# Patient Record
Sex: FEMALE | Race: BLACK OR AFRICAN AMERICAN | NOT HISPANIC OR LATINO
[De-identification: names, ages, dates, MRNs, and addresses within clinical notes are randomized per-mention and may not be internally consistent; named-entity substitution may affect disease eponyms.]

---

## 2017-06-14 ENCOUNTER — OTHER (OUTPATIENT)
Age: 67
End: 2017-06-14

## 2017-06-14 DIAGNOSIS — M25.561 PAIN IN RIGHT KNEE: ICD-10-CM

## 2017-06-14 DIAGNOSIS — M25.562 PAIN IN RIGHT KNEE: ICD-10-CM

## 2017-06-28 ENCOUNTER — OTHER (OUTPATIENT)
Age: 67
End: 2017-06-28

## 2017-07-06 ENCOUNTER — APPOINTMENT (OUTPATIENT)
Dept: ORTHOPEDIC SURGERY | Facility: CLINIC | Age: 67
End: 2017-07-06

## 2017-07-06 VITALS
WEIGHT: 209 LBS | SYSTOLIC BLOOD PRESSURE: 156 MMHG | BODY MASS INDEX: 38.46 KG/M2 | HEIGHT: 62 IN | DIASTOLIC BLOOD PRESSURE: 78 MMHG | HEART RATE: 72 BPM

## 2017-07-06 DIAGNOSIS — Z86.39 PERSONAL HISTORY OF OTHER ENDOCRINE, NUTRITIONAL AND METABOLIC DISEASE: ICD-10-CM

## 2017-07-06 DIAGNOSIS — Z78.9 OTHER SPECIFIED HEALTH STATUS: ICD-10-CM

## 2017-08-04 ENCOUNTER — OUTPATIENT (OUTPATIENT)
Dept: OUTPATIENT SERVICES | Facility: HOSPITAL | Age: 67
LOS: 1 days | End: 2017-08-04
Payer: MEDICARE

## 2017-08-04 VITALS
TEMPERATURE: 98 F | DIASTOLIC BLOOD PRESSURE: 88 MMHG | SYSTOLIC BLOOD PRESSURE: 150 MMHG | HEIGHT: 62 IN | HEART RATE: 64 BPM | RESPIRATION RATE: 16 BRPM | WEIGHT: 215.17 LBS

## 2017-08-04 DIAGNOSIS — Z98.890 OTHER SPECIFIED POSTPROCEDURAL STATES: Chronic | ICD-10-CM

## 2017-08-04 DIAGNOSIS — I10 ESSENTIAL (PRIMARY) HYPERTENSION: ICD-10-CM

## 2017-08-04 DIAGNOSIS — Z01.818 ENCOUNTER FOR OTHER PREPROCEDURAL EXAMINATION: ICD-10-CM

## 2017-08-04 DIAGNOSIS — M17.11 UNILATERAL PRIMARY OSTEOARTHRITIS, RIGHT KNEE: ICD-10-CM

## 2017-08-04 DIAGNOSIS — E11.9 TYPE 2 DIABETES MELLITUS WITHOUT COMPLICATIONS: ICD-10-CM

## 2017-08-04 LAB
ANION GAP SERPL CALC-SCNC: 14 MMOL/L — SIGNIFICANT CHANGE UP (ref 5–17)
APTT BLD: 29 SEC — SIGNIFICANT CHANGE UP (ref 27.5–37.4)
BASOPHILS # BLD AUTO: 0 K/UL — SIGNIFICANT CHANGE UP (ref 0–0.2)
BASOPHILS NFR BLD AUTO: 0.2 % — SIGNIFICANT CHANGE UP (ref 0–2)
BLD GP AB SCN SERPL QL: SIGNIFICANT CHANGE UP
BUN SERPL-MCNC: 14 MG/DL — SIGNIFICANT CHANGE UP (ref 8–20)
CALCIUM SERPL-MCNC: 9.6 MG/DL — SIGNIFICANT CHANGE UP (ref 8.6–10.2)
CHLORIDE SERPL-SCNC: 100 MMOL/L — SIGNIFICANT CHANGE UP (ref 98–107)
CO2 SERPL-SCNC: 26 MMOL/L — SIGNIFICANT CHANGE UP (ref 22–29)
CREAT SERPL-MCNC: 0.86 MG/DL — SIGNIFICANT CHANGE UP (ref 0.5–1.3)
EOSINOPHIL # BLD AUTO: 0.1 K/UL — SIGNIFICANT CHANGE UP (ref 0–0.5)
EOSINOPHIL NFR BLD AUTO: 2.4 % — SIGNIFICANT CHANGE UP (ref 0–6)
GLUCOSE SERPL-MCNC: 100 MG/DL — SIGNIFICANT CHANGE UP (ref 70–115)
HBA1C BLD-MCNC: 6.4 % — HIGH (ref 4–5.6)
HCT VFR BLD CALC: 32.2 % — LOW (ref 37–47)
HGB BLD-MCNC: 10.4 G/DL — LOW (ref 12–16)
INR BLD: 1.03 RATIO — SIGNIFICANT CHANGE UP (ref 0.88–1.16)
LYMPHOCYTES # BLD AUTO: 2.1 K/UL — SIGNIFICANT CHANGE UP (ref 1–4.8)
LYMPHOCYTES # BLD AUTO: 38.1 % — SIGNIFICANT CHANGE UP (ref 20–55)
MCHC RBC-ENTMCNC: 27.4 PG — SIGNIFICANT CHANGE UP (ref 27–31)
MCHC RBC-ENTMCNC: 32.3 G/DL — SIGNIFICANT CHANGE UP (ref 32–36)
MCV RBC AUTO: 85 FL — SIGNIFICANT CHANGE UP (ref 81–99)
MONOCYTES # BLD AUTO: 0.3 K/UL — SIGNIFICANT CHANGE UP (ref 0–0.8)
MONOCYTES NFR BLD AUTO: 4.8 % — SIGNIFICANT CHANGE UP (ref 3–10)
MRSA PCR RESULT.: SIGNIFICANT CHANGE UP
NEUTROPHILS # BLD AUTO: 3 K/UL — SIGNIFICANT CHANGE UP (ref 1.8–8)
NEUTROPHILS NFR BLD AUTO: 54.3 % — SIGNIFICANT CHANGE UP (ref 37–73)
PLATELET # BLD AUTO: 311 K/UL — SIGNIFICANT CHANGE UP (ref 150–400)
POTASSIUM SERPL-MCNC: 4.4 MMOL/L — SIGNIFICANT CHANGE UP (ref 3.5–5.3)
POTASSIUM SERPL-SCNC: 4.4 MMOL/L — SIGNIFICANT CHANGE UP (ref 3.5–5.3)
PROTHROM AB SERPL-ACNC: 11.3 SEC — SIGNIFICANT CHANGE UP (ref 9.8–12.7)
RBC # BLD: 3.79 M/UL — LOW (ref 4.4–5.2)
RBC # FLD: 14.7 % — SIGNIFICANT CHANGE UP (ref 11–15.6)
S AUREUS DNA NOSE QL NAA+PROBE: SIGNIFICANT CHANGE UP
SODIUM SERPL-SCNC: 140 MMOL/L — SIGNIFICANT CHANGE UP (ref 135–145)
TYPE + AB SCN PNL BLD: SIGNIFICANT CHANGE UP
WBC # BLD: 5.4 K/UL — SIGNIFICANT CHANGE UP (ref 4.8–10.8)
WBC # FLD AUTO: 5.4 K/UL — SIGNIFICANT CHANGE UP (ref 4.8–10.8)

## 2017-08-04 PROCEDURE — 93010 ELECTROCARDIOGRAM REPORT: CPT

## 2017-08-04 PROCEDURE — 86850 RBC ANTIBODY SCREEN: CPT

## 2017-08-04 PROCEDURE — 86900 BLOOD TYPING SEROLOGIC ABO: CPT

## 2017-08-04 PROCEDURE — G0463: CPT

## 2017-08-04 PROCEDURE — 85730 THROMBOPLASTIN TIME PARTIAL: CPT

## 2017-08-04 PROCEDURE — 83036 HEMOGLOBIN GLYCOSYLATED A1C: CPT

## 2017-08-04 PROCEDURE — 87641 MR-STAPH DNA AMP PROBE: CPT

## 2017-08-04 PROCEDURE — 85027 COMPLETE CBC AUTOMATED: CPT

## 2017-08-04 PROCEDURE — 86901 BLOOD TYPING SEROLOGIC RH(D): CPT

## 2017-08-04 PROCEDURE — 85610 PROTHROMBIN TIME: CPT

## 2017-08-04 PROCEDURE — 87640 STAPH A DNA AMP PROBE: CPT

## 2017-08-04 PROCEDURE — 93005 ELECTROCARDIOGRAM TRACING: CPT

## 2017-08-04 PROCEDURE — 80048 BASIC METABOLIC PNL TOTAL CA: CPT

## 2017-08-04 PROCEDURE — 36415 COLL VENOUS BLD VENIPUNCTURE: CPT

## 2017-08-04 RX ORDER — SODIUM CHLORIDE 9 MG/ML
3 INJECTION INTRAMUSCULAR; INTRAVENOUS; SUBCUTANEOUS EVERY 8 HOURS
Qty: 0 | Refills: 0 | Status: DISCONTINUED | OUTPATIENT
Start: 2017-08-25 | End: 2017-08-25

## 2017-08-04 NOTE — H&P PST ADULT - PMH
Diabetes mellitus    GERD (gastroesophageal reflux disease)    Hyperlipidemia    Hypertension    Osteoarthritis    Sleep apnea

## 2017-08-04 NOTE — H&P PST ADULT - ASSESSMENT
66F PMH HTN, Hyperlipidemia, DM, Sleep Apnea, GERD and Osteoarthritis for Right Knee Total Joint Replacement.

## 2017-08-04 NOTE — H&P PST ADULT - FAMILY HISTORY
Mother  Still living? No  Family history of renal failure, Age at diagnosis: 61-70  Family history of hypertension, Age at diagnosis: Age Unknown     Sibling  Still living? Yes, Estimated age: Age Unknown  Family history of renal failure, Age at diagnosis: Age Unknown     Sibling  Still living? Yes, Estimated age: Age Unknown  Family history of renal failure, Age at diagnosis: Age Unknown     Father  Still living? No  Family history of coronary artery disease, Age at diagnosis: Age Unknown  Family history of diabetes mellitus, Age at diagnosis: Age Unknown

## 2017-08-07 ENCOUNTER — OTHER (OUTPATIENT)
Age: 67
End: 2017-08-07

## 2017-08-10 ENCOUNTER — APPOINTMENT (OUTPATIENT)
Dept: ORTHOPEDIC SURGERY | Facility: CLINIC | Age: 67
End: 2017-08-10
Payer: MEDICARE

## 2017-08-10 VITALS
DIASTOLIC BLOOD PRESSURE: 76 MMHG | HEIGHT: 62 IN | SYSTOLIC BLOOD PRESSURE: 148 MMHG | HEART RATE: 77 BPM | BODY MASS INDEX: 38.64 KG/M2 | WEIGHT: 210 LBS

## 2017-08-10 DIAGNOSIS — M17.11 UNILATERAL PRIMARY OSTEOARTHRITIS, RIGHT KNEE: ICD-10-CM

## 2017-08-10 PROCEDURE — 99213 OFFICE O/P EST LOW 20 MIN: CPT | Mod: 25

## 2017-08-10 PROCEDURE — 20610 DRAIN/INJ JOINT/BURSA W/O US: CPT | Mod: LT

## 2017-08-15 RX ORDER — VANCOMYCIN HCL 1 G
1500 VIAL (EA) INTRAVENOUS ONCE
Qty: 0 | Refills: 0 | Status: COMPLETED | OUTPATIENT
Start: 2017-08-25 | End: 2017-08-25

## 2017-08-15 RX ORDER — CEFAZOLIN SODIUM 1 G
2000 VIAL (EA) INJECTION ONCE
Qty: 0 | Refills: 0 | Status: DISCONTINUED | OUTPATIENT
Start: 2017-08-25 | End: 2017-08-25

## 2017-08-15 RX ORDER — TRANEXAMIC ACID 100 MG/ML
1000 INJECTION, SOLUTION INTRAVENOUS ONCE
Qty: 0 | Refills: 0 | Status: DISCONTINUED | OUTPATIENT
Start: 2017-08-25 | End: 2017-08-25

## 2017-08-15 RX ORDER — OXYCODONE HYDROCHLORIDE 5 MG/1
20 TABLET ORAL ONCE
Qty: 0 | Refills: 0 | Status: DISCONTINUED | OUTPATIENT
Start: 2017-08-25 | End: 2017-08-25

## 2017-08-15 RX ORDER — SCOPALAMINE 1 MG/3D
1.5 PATCH, EXTENDED RELEASE TRANSDERMAL ONCE
Qty: 0 | Refills: 0 | Status: COMPLETED | OUTPATIENT
Start: 2017-08-25 | End: 2017-08-25

## 2017-08-15 RX ORDER — GABAPENTIN 400 MG/1
300 CAPSULE ORAL ONCE
Qty: 0 | Refills: 0 | Status: COMPLETED | OUTPATIENT
Start: 2017-08-25 | End: 2017-08-25

## 2017-08-15 RX ORDER — ACETAMINOPHEN 500 MG
1000 TABLET ORAL ONCE
Qty: 0 | Refills: 0 | Status: DISCONTINUED | OUTPATIENT
Start: 2017-08-25 | End: 2017-08-25

## 2017-08-15 RX ORDER — CELECOXIB 200 MG/1
400 CAPSULE ORAL ONCE
Qty: 0 | Refills: 0 | Status: COMPLETED | OUTPATIENT
Start: 2017-08-25 | End: 2017-08-25

## 2017-08-16 DIAGNOSIS — E78.5 HYPERLIPIDEMIA, UNSPECIFIED: ICD-10-CM

## 2017-08-16 DIAGNOSIS — I10 ESSENTIAL (PRIMARY) HYPERTENSION: ICD-10-CM

## 2017-08-16 DIAGNOSIS — K21.9 GASTRO-ESOPHAGEAL REFLUX DISEASE WITHOUT ESOPHAGITIS: ICD-10-CM

## 2017-08-16 NOTE — CONSULT NOTE ADULT - PROBLEM SELECTOR RECOMMENDATION 3
Hgb A1c 6.4  Continue Metformin.  Will need sliding scale coverage post-op. Type 2 , well controlled . Hgb A1c 6.4  Continue Metformin. Hold Metformin on day of surgery .  Will need sliding scale coverage post-op.

## 2017-08-16 NOTE — CONSULT NOTE ADULT - ASSESSMENT
65y/o female with PMH HTN, HLD, DM2, GERD, osteoarthritis presented for medical clearance for Right Knee Total Joint Replacement. Patient reports she has had right knee pain for 10years with worsening pain and difficulty ambulating, now using cane.  Patient has had cortisone injections in the past with limited relief.

## 2017-08-16 NOTE — CONSULT NOTE ADULT - SUBJECTIVE AND OBJECTIVE BOX
PMD: Dr. Aviles    CC: Right knee pain [id number]    HPI:  67y/o female with PMH HTN, HLD, DM2, GERD, osteoarthritis presented for medical clearance for Right Knee Total Joint Replacement. Patient reports she has had right knee pain for 10years with worsening pain and difficulty ambulating, now using cane.  Patient has had cortisone injections in the past with limited relief.        PAST MEDICAL & SURGICAL HISTORY:  Sleep apnea  Osteoarthritis  GERD (gastroesophageal reflux disease)  Hyperlipidemia  Diabetes mellitus  Hypertension  History of ovarian cystectomy  History of shoulder surgery: Left    FAMILY HISTORY:  Family history of diabetes mellitus (Father)  Family history of coronary artery disease (Father)  Family history of hypertension (Mother)  Family history of renal failure (Mother, Sibling, Sibling)    SOCIAL HISTORY:  Lives with sons    Tobacco - Denies  ETOH - Occasionally  Drug use - Denies    ALLERGIES:  NKDA    HOME MEDICATIONS:  Lisinopril 40mg PO daily  Metformin 500mg PO BID  Omeprazole 20mg PO daily  Crestor 20mg PO qHS  ASA 81mg PO daily  Norvasc 5mg PO daily    REVIEW OF SYSTEMS:  CONSTITUTIONAL: No fever, weight loss, or fatigue  RESPIRATORY: No cough, wheezing, or chills; No shortness of breath  CARDIOVASCULAR: No chest pain, palpitations, dizziness, or leg swelling  GASTROINTESTINAL: No abdominal or epigastric pain. No nausea or vomiting; No diarrhea or constipation.   GENITOURINARY: No dysuria, frequency, hematuria, or incontinence  NEUROLOGICAL: No headaches, memory loss, loss of strength, numbness, or tremors  SKIN: No itching, burning, rashes, or lesions   MUSCULOSKELETAL: No joint swelling; No muscle or back pain; right knee pain  PSYCHIATRIC: No depression, anxiety, mood swings, or difficulty sleeping      Vital Signs Last 24 Hrs  T(C): --  T(F): --  HR: -- 64  BP: -- 156/96  BP(mean): --  RR: -- 16  SpO2: --    PHYSICAL EXAM:  GENERAL: NAD, well-groomed, well-developed  HEAD:  Atraumatic, Normocephalic  EYES: EOMI, PERRLA, conjunctiva and sclera clear  NECK: Supple, No JVD, Normal thyroid  NERVOUS SYSTEM:  Alert & Oriented X3, Good concentration; Motor Strength 5/5 B/L upper and lower extremities  CHEST/LUNG: Clear to auscultation bilaterally; No rales, rhonchi, wheezing, or rubs  HEART: Regular rate and rhythm; No murmurs, rubs, or gallops  ABDOMEN: Soft, Nontender, Nondistended; Bowel sounds present  EXTREMITIES:  2+ Peripheral Pulses, No clubbing, cyanosis, or edema  SKIN: No rashes or lesions          LABS:  Hemoglobin A1C, Whole Blood (08.04.17 @ 09:27)    Hemoglobin A1C, Whole Blood: 6.4 %    Activated Partial Thromboplastin Time (08.04.17 @ 09:27)    Activated Partial Thromboplastin Time: 29.0  Prothrombin Time and INR, Plasma (08.04.17 @ 09:27)    Prothrombin Time, Plasma: 11.3    INR: 1.03    Complete Blood Count + Automated Diff (08.04.17 @ 09:27)    WBC Count: 5.4 K/uL    RBC Count: 3.79 M/uL    Hemoglobin: 10.4 g/dL    Hematocrit: 32.2 %    Mean Cell Volume: 85.0 fl    Mean Cell Hemoglobin: 27.4 pg    Mean Cell Hemoglobin Conc: 32.3 g/dL    Red Cell Distrib Width: 14.7 %    Platelet Count - Automated: 311 K/uL    Auto Neutrophil #: 3.0 K/uL    Auto Lymphocyte #: 2.1 K/uL    Auto Monocyte #: 0.3 K/uL    Auto Eosinophil #: 0.1 K/uL    Auto Basophil #: 0.0 K/uL    Auto Neutrophil %: 54.3: Differential percentages must be correlated with absolute numbers for  clinical significance. %    Auto Lymphocyte %: 38.1 %    Auto Monocyte %: 4.8 %    Auto Eosinophil %: 2.4 %    Auto Basophil %: 0.2 %    Basic Metabolic Panel (08.04.17 @ 09:27)    Sodium, Serum: 140 mmol/L    Potassium, Serum: 4.4 mmol/L    Chloride, Serum: 100 mmol/L    Carbon Dioxide, Serum: 26.0 mmol/L    Anion Gap, Serum: 14 mmol/L    Blood Urea Nitrogen, Serum: 14.0 mg/dL    Creatinine, Serum: 0.86 mg/dL    Glucose, Serum: 100 mg/dL    Calcium, Total Serum: 9.6 mg/dL    eGFR if Non : 71    eGFR if : 83 mL/min/1.73M2 PMD: Dr. Aviles    CC: Right knee pain [id number] ,pain was getting worst lately with difficulty ambulating .    HPI:  67y/o female with PMH HTN, HLD, DM2, GERD, osteoarthritis presented for medical clearance for Right Knee Total Joint Replacement. Patient reports she has had right knee pain for 10years with worsening pain and difficulty ambulating, now using cane.  Patient has had cortisone injections in the past with limited relief.        PAST MEDICAL & SURGICAL HISTORY:  Sleep apnea  Osteoarthritis  GERD (gastroesophageal reflux disease)  Hyperlipidemia  Diabetes mellitus  Hypertension  History of ovarian cystectomy  History of shoulder surgery: Left    FAMILY HISTORY:  Family history of diabetes mellitus (Father)  Family history of coronary artery disease (Father)  Family history of hypertension (Mother)  Family history of renal failure (Mother, Sibling, Sibling)    SOCIAL HISTORY:  Lives with sons    Tobacco - Denies  ETOH - Occasionally  Drug use - Denies    ALLERGIES:  NKDA    HOME MEDICATIONS:  Lisinopril 40mg PO daily  Metformin 500mg PO BID  Omeprazole 20mg PO daily  Crestor 20mg PO qHS  ASA 81mg PO daily  Norvasc 5mg PO daily    REVIEW OF SYSTEMS:  CONSTITUTIONAL: No fever, weight loss, or fatigue  RESPIRATORY: No cough, wheezing, or chills; No shortness of breath  CARDIOVASCULAR: No chest pain, palpitations, dizziness, or leg swelling  GASTROINTESTINAL: No abdominal or epigastric pain. No nausea or vomiting; No diarrhea or constipation.   GENITOURINARY: No dysuria, frequency, hematuria, or incontinence  NEUROLOGICAL: No headaches, memory loss, loss of strength, numbness, or tremors  SKIN: No itching, burning, rashes, or lesions   MUSCULOSKELETAL: No joint swelling; No muscle or back pain; right knee pain  PSYCHIATRIC: No depression, anxiety, mood swings, or difficulty sleeping      Vital Signs Last 24 Hrs  T(C): --  T(F): --  HR: -- 64  BP: -- 156/96  BP(mean): --  RR: -- 16  SpO2: --    PHYSICAL EXAM:  GENERAL: NAD, well-groomed, well-developed  HEAD:  Atraumatic, Normocephalic  EYES: EOMI, PERRLA, conjunctiva and sclera clear  NECK: Supple, No JVD, Normal thyroid  NERVOUS SYSTEM:  Alert & Oriented X3, Good concentration; Motor Strength 5/5 B/L upper and lower extremities  CHEST/LUNG: Clear to auscultation bilaterally; No rales, rhonchi, wheezing, or rubs  HEART: Regular rate and rhythm; No murmurs, rubs, or gallops  ABDOMEN: Soft, Nontender, Nondistended; Bowel sounds present  EXTREMITIES:  2+ Peripheral Pulses, No clubbing, cyanosis, or edema  SKIN: No rashes or lesions          LABS:  Hemoglobin A1C, Whole Blood (08.04.17 @ 09:27)    Hemoglobin A1C, Whole Blood: 6.4 %    Activated Partial Thromboplastin Time (08.04.17 @ 09:27)    Activated Partial Thromboplastin Time: 29.0  Prothrombin Time and INR, Plasma (08.04.17 @ 09:27)    Prothrombin Time, Plasma: 11.3    INR: 1.03    Complete Blood Count + Automated Diff (08.04.17 @ 09:27)    WBC Count: 5.4 K/uL    RBC Count: 3.79 M/uL    Hemoglobin: 10.4 g/dL    Hematocrit: 32.2 %    Mean Cell Volume: 85.0 fl    Mean Cell Hemoglobin: 27.4 pg    Mean Cell Hemoglobin Conc: 32.3 g/dL    Red Cell Distrib Width: 14.7 %    Platelet Count - Automated: 311 K/uL    Auto Neutrophil #: 3.0 K/uL    Auto Lymphocyte #: 2.1 K/uL    Auto Monocyte #: 0.3 K/uL    Auto Eosinophil #: 0.1 K/uL    Auto Basophil #: 0.0 K/uL    Auto Neutrophil %: 54.3: Differential percentages must be correlated with absolute numbers for  clinical significance. %    Auto Lymphocyte %: 38.1 %    Auto Monocyte %: 4.8 %    Auto Eosinophil %: 2.4 %    Auto Basophil %: 0.2 %    Basic Metabolic Panel (08.04.17 @ 09:27)    Sodium, Serum: 140 mmol/L    Potassium, Serum: 4.4 mmol/L    Chloride, Serum: 100 mmol/L    Carbon Dioxide, Serum: 26.0 mmol/L    Anion Gap, Serum: 14 mmol/L    Blood Urea Nitrogen, Serum: 14.0 mg/dL    Creatinine, Serum: 0.86 mg/dL    Glucose, Serum: 100 mg/dL    Calcium, Total Serum: 9.6 mg/dL    eGFR if Non : 71    eGFR if : 83 mL/min/1.73M2    EKG( 8/4 /17 ) - visualized by me   < from: 12 Lead ECG (08.04.17 @ 09:35) >    Ventricular Rate 61 BPM    Atrial Rate 61 BPM    P-R Interval 144 ms    QRS Duration 82 ms    Q-T Interval 402 ms    QTC Calculation(Bezet) 404 ms    P Axis 60 degrees    R Axis 21 degrees    T Axis 30 degrees    Diagnosis Line Normal sinus rhythm  Normal ECG    Confirmed by KP DAVILA ([id number]) on 8/6/2017 6:29:59 PM    < end of copied text >    Patient had ECHO ( 2/17 )- nl EF   and PET stress test (2/17 ) - negative .

## 2017-08-17 ENCOUNTER — APPOINTMENT (OUTPATIENT)
Dept: ORTHOPEDIC SURGERY | Facility: CLINIC | Age: 67
End: 2017-08-17
Payer: MEDICARE

## 2017-08-17 VITALS
WEIGHT: 210 LBS | DIASTOLIC BLOOD PRESSURE: 88 MMHG | HEIGHT: 62 IN | HEART RATE: 89 BPM | BODY MASS INDEX: 38.64 KG/M2 | SYSTOLIC BLOOD PRESSURE: 152 MMHG

## 2017-08-17 PROCEDURE — 20610 DRAIN/INJ JOINT/BURSA W/O US: CPT | Mod: LT

## 2017-08-24 ENCOUNTER — FORM ENCOUNTER (OUTPATIENT)
Age: 67
End: 2017-08-24

## 2017-08-24 ENCOUNTER — APPOINTMENT (OUTPATIENT)
Dept: ORTHOPEDIC SURGERY | Facility: CLINIC | Age: 67
End: 2017-08-24
Payer: MEDICARE

## 2017-08-24 VITALS
WEIGHT: 210 LBS | DIASTOLIC BLOOD PRESSURE: 75 MMHG | HEART RATE: 73 BPM | HEIGHT: 62 IN | BODY MASS INDEX: 38.64 KG/M2 | SYSTOLIC BLOOD PRESSURE: 149 MMHG

## 2017-08-24 PROCEDURE — 20610 DRAIN/INJ JOINT/BURSA W/O US: CPT | Mod: LT

## 2017-08-25 ENCOUNTER — APPOINTMENT (OUTPATIENT)
Dept: ORTHOPEDIC SURGERY | Facility: HOSPITAL | Age: 67
End: 2017-08-25

## 2017-08-25 ENCOUNTER — TRANSCRIPTION ENCOUNTER (OUTPATIENT)
Age: 67
End: 2017-08-25

## 2017-08-25 ENCOUNTER — RESULT REVIEW (OUTPATIENT)
Age: 67
End: 2017-08-25

## 2017-08-25 ENCOUNTER — INPATIENT (INPATIENT)
Facility: HOSPITAL | Age: 67
LOS: 1 days | Discharge: HOME | DRG: 470 | End: 2017-08-27
Attending: ORTHOPAEDIC SURGERY | Admitting: ORTHOPAEDIC SURGERY
Payer: MEDICARE

## 2017-08-25 VITALS
HEART RATE: 72 BPM | OXYGEN SATURATION: 97 % | HEIGHT: 62 IN | WEIGHT: 205.03 LBS | TEMPERATURE: 98 F | RESPIRATION RATE: 16 BRPM | DIASTOLIC BLOOD PRESSURE: 72 MMHG | SYSTOLIC BLOOD PRESSURE: 155 MMHG

## 2017-08-25 DIAGNOSIS — Z98.890 OTHER SPECIFIED POSTPROCEDURAL STATES: Chronic | ICD-10-CM

## 2017-08-25 DIAGNOSIS — G47.30 SLEEP APNEA, UNSPECIFIED: ICD-10-CM

## 2017-08-25 DIAGNOSIS — E66.9 OBESITY, UNSPECIFIED: ICD-10-CM

## 2017-08-25 DIAGNOSIS — M17.11 UNILATERAL PRIMARY OSTEOARTHRITIS, RIGHT KNEE: ICD-10-CM

## 2017-08-25 LAB
BLD GP AB SCN SERPL QL: SIGNIFICANT CHANGE UP
TYPE + AB SCN PNL BLD: SIGNIFICANT CHANGE UP

## 2017-08-25 PROCEDURE — 20985 CPTR-ASST DIR MS PX: CPT | Mod: AS,59,RT

## 2017-08-25 PROCEDURE — 27447 TOTAL KNEE ARTHROPLASTY: CPT | Mod: RT

## 2017-08-25 PROCEDURE — 27447 TOTAL KNEE ARTHROPLASTY: CPT | Mod: AS,RT

## 2017-08-25 PROCEDURE — 73560 X-RAY EXAM OF KNEE 1 OR 2: CPT | Mod: 26,RT

## 2017-08-25 PROCEDURE — 20985 CPTR-ASST DIR MS PX: CPT | Mod: 59,RT

## 2017-08-25 PROCEDURE — 99223 1ST HOSP IP/OBS HIGH 75: CPT

## 2017-08-25 PROCEDURE — 88305 TISSUE EXAM BY PATHOLOGIST: CPT | Mod: 26

## 2017-08-25 PROCEDURE — 88311 DECALCIFY TISSUE: CPT | Mod: 26

## 2017-08-25 RX ORDER — ASPIRIN/CALCIUM CARB/MAGNESIUM 324 MG
325 TABLET ORAL
Qty: 0 | Refills: 0 | Status: DISCONTINUED | OUTPATIENT
Start: 2017-08-26 | End: 2017-08-27

## 2017-08-25 RX ORDER — ONDANSETRON 8 MG/1
4 TABLET, FILM COATED ORAL ONCE
Qty: 0 | Refills: 0 | Status: DISCONTINUED | OUTPATIENT
Start: 2017-08-25 | End: 2017-08-25

## 2017-08-25 RX ORDER — MAGNESIUM HYDROXIDE 400 MG/1
30 TABLET, CHEWABLE ORAL DAILY
Qty: 0 | Refills: 0 | Status: DISCONTINUED | OUTPATIENT
Start: 2017-08-25 | End: 2017-08-27

## 2017-08-25 RX ORDER — ONDANSETRON 8 MG/1
4 TABLET, FILM COATED ORAL EVERY 6 HOURS
Qty: 0 | Refills: 0 | Status: DISCONTINUED | OUTPATIENT
Start: 2017-08-25 | End: 2017-08-27

## 2017-08-25 RX ORDER — INSULIN LISPRO 100/ML
VIAL (ML) SUBCUTANEOUS
Qty: 0 | Refills: 0 | Status: DISCONTINUED | OUTPATIENT
Start: 2017-08-25 | End: 2017-08-27

## 2017-08-25 RX ORDER — DEXTROSE 50 % IN WATER 50 %
12.5 SYRINGE (ML) INTRAVENOUS ONCE
Qty: 0 | Refills: 0 | Status: DISCONTINUED | OUTPATIENT
Start: 2017-08-25 | End: 2017-08-27

## 2017-08-25 RX ORDER — OXYCODONE HYDROCHLORIDE 5 MG/1
5 TABLET ORAL
Qty: 0 | Refills: 0 | Status: DISCONTINUED | OUTPATIENT
Start: 2017-08-25 | End: 2017-08-27

## 2017-08-25 RX ORDER — DOCUSATE SODIUM 100 MG
100 CAPSULE ORAL THREE TIMES A DAY
Qty: 0 | Refills: 0 | Status: DISCONTINUED | OUTPATIENT
Start: 2017-08-25 | End: 2017-08-27

## 2017-08-25 RX ORDER — GLUCAGON INJECTION, SOLUTION 0.5 MG/.1ML
1 INJECTION, SOLUTION SUBCUTANEOUS ONCE
Qty: 0 | Refills: 0 | Status: DISCONTINUED | OUTPATIENT
Start: 2017-08-25 | End: 2017-08-27

## 2017-08-25 RX ORDER — ATORVASTATIN CALCIUM 80 MG/1
20 TABLET, FILM COATED ORAL AT BEDTIME
Qty: 0 | Refills: 0 | Status: DISCONTINUED | OUTPATIENT
Start: 2017-08-25 | End: 2017-08-27

## 2017-08-25 RX ORDER — VANCOMYCIN HCL 1 G
1500 VIAL (EA) INTRAVENOUS
Qty: 0 | Refills: 0 | Status: COMPLETED | OUTPATIENT
Start: 2017-08-25 | End: 2017-08-25

## 2017-08-25 RX ORDER — DEXTROSE 50 % IN WATER 50 %
25 SYRINGE (ML) INTRAVENOUS ONCE
Qty: 0 | Refills: 0 | Status: DISCONTINUED | OUTPATIENT
Start: 2017-08-25 | End: 2017-08-27

## 2017-08-25 RX ORDER — FENTANYL CITRATE 50 UG/ML
25 INJECTION INTRAVENOUS
Qty: 0 | Refills: 0 | Status: DISCONTINUED | OUTPATIENT
Start: 2017-08-25 | End: 2017-08-25

## 2017-08-25 RX ORDER — ACETAMINOPHEN 500 MG
975 TABLET ORAL EVERY 8 HOURS
Qty: 0 | Refills: 0 | Status: DISCONTINUED | OUTPATIENT
Start: 2017-08-26 | End: 2017-08-27

## 2017-08-25 RX ORDER — DEXTROSE 50 % IN WATER 50 %
1 SYRINGE (ML) INTRAVENOUS ONCE
Qty: 0 | Refills: 0 | Status: DISCONTINUED | OUTPATIENT
Start: 2017-08-25 | End: 2017-08-27

## 2017-08-25 RX ORDER — OXYCODONE HYDROCHLORIDE 5 MG/1
10 TABLET ORAL EVERY 12 HOURS
Qty: 0 | Refills: 0 | Status: DISCONTINUED | OUTPATIENT
Start: 2017-08-25 | End: 2017-08-27

## 2017-08-25 RX ORDER — CEFAZOLIN SODIUM 1 G
2000 VIAL (EA) INJECTION
Qty: 0 | Refills: 0 | Status: COMPLETED | OUTPATIENT
Start: 2017-08-25 | End: 2017-08-26

## 2017-08-25 RX ORDER — SODIUM CHLORIDE 9 MG/ML
1000 INJECTION, SOLUTION INTRAVENOUS
Qty: 0 | Refills: 0 | Status: DISCONTINUED | OUTPATIENT
Start: 2017-08-25 | End: 2017-08-25

## 2017-08-25 RX ORDER — HYDROMORPHONE HYDROCHLORIDE 2 MG/ML
0.5 INJECTION INTRAMUSCULAR; INTRAVENOUS; SUBCUTANEOUS EVERY 4 HOURS
Qty: 0 | Refills: 0 | Status: DISCONTINUED | OUTPATIENT
Start: 2017-08-25 | End: 2017-08-27

## 2017-08-25 RX ORDER — LISINOPRIL 2.5 MG/1
40 TABLET ORAL DAILY
Qty: 0 | Refills: 0 | Status: DISCONTINUED | OUTPATIENT
Start: 2017-08-25 | End: 2017-08-26

## 2017-08-25 RX ORDER — ACETAMINOPHEN 500 MG
1000 TABLET ORAL
Qty: 0 | Refills: 0 | Status: COMPLETED | OUTPATIENT
Start: 2017-08-25 | End: 2017-08-25

## 2017-08-25 RX ORDER — OXYCODONE HYDROCHLORIDE 5 MG/1
10 TABLET ORAL
Qty: 0 | Refills: 0 | Status: DISCONTINUED | OUTPATIENT
Start: 2017-08-25 | End: 2017-08-27

## 2017-08-25 RX ORDER — HYDROMORPHONE HYDROCHLORIDE 2 MG/ML
0.5 INJECTION INTRAMUSCULAR; INTRAVENOUS; SUBCUTANEOUS
Qty: 0 | Refills: 0 | Status: DISCONTINUED | OUTPATIENT
Start: 2017-08-25 | End: 2017-08-25

## 2017-08-25 RX ORDER — VANCOMYCIN HCL 1 G
1250 VIAL (EA) INTRAVENOUS
Qty: 0 | Refills: 0 | Status: DISCONTINUED | OUTPATIENT
Start: 2017-08-25 | End: 2017-08-25

## 2017-08-25 RX ORDER — SODIUM CHLORIDE 9 MG/ML
1000 INJECTION, SOLUTION INTRAVENOUS
Qty: 0 | Refills: 0 | Status: DISCONTINUED | OUTPATIENT
Start: 2017-08-25 | End: 2017-08-27

## 2017-08-25 RX ORDER — SODIUM CHLORIDE 9 MG/ML
1000 INJECTION, SOLUTION INTRAVENOUS
Qty: 0 | Refills: 0 | Status: DISCONTINUED | OUTPATIENT
Start: 2017-08-25 | End: 2017-08-26

## 2017-08-25 RX ORDER — SENNA PLUS 8.6 MG/1
2 TABLET ORAL AT BEDTIME
Qty: 0 | Refills: 0 | Status: DISCONTINUED | OUTPATIENT
Start: 2017-08-25 | End: 2017-08-27

## 2017-08-25 RX ORDER — METFORMIN HYDROCHLORIDE 850 MG/1
500 TABLET ORAL EVERY 12 HOURS
Qty: 0 | Refills: 0 | Status: DISCONTINUED | OUTPATIENT
Start: 2017-08-25 | End: 2017-08-26

## 2017-08-25 RX ORDER — KETOROLAC TROMETHAMINE 30 MG/ML
15 SYRINGE (ML) INJECTION EVERY 6 HOURS
Qty: 0 | Refills: 0 | Status: DISCONTINUED | OUTPATIENT
Start: 2017-08-26 | End: 2017-08-26

## 2017-08-25 RX ADMIN — OXYCODONE HYDROCHLORIDE 10 MILLIGRAM(S): 5 TABLET ORAL at 22:16

## 2017-08-25 RX ADMIN — OXYCODONE HYDROCHLORIDE 20 MILLIGRAM(S): 5 TABLET ORAL at 09:53

## 2017-08-25 RX ADMIN — ATORVASTATIN CALCIUM 20 MILLIGRAM(S): 80 TABLET, FILM COATED ORAL at 21:03

## 2017-08-25 RX ADMIN — SCOPALAMINE 1.5 MILLIGRAM(S): 1 PATCH, EXTENDED RELEASE TRANSDERMAL at 09:53

## 2017-08-25 RX ADMIN — CELECOXIB 400 MILLIGRAM(S): 200 CAPSULE ORAL at 09:52

## 2017-08-25 RX ADMIN — Medication 300 MILLIGRAM(S): at 11:24

## 2017-08-25 RX ADMIN — FENTANYL CITRATE 25 MICROGRAM(S): 50 INJECTION INTRAVENOUS at 17:00

## 2017-08-25 RX ADMIN — OXYCODONE HYDROCHLORIDE 10 MILLIGRAM(S): 5 TABLET ORAL at 22:46

## 2017-08-25 RX ADMIN — FENTANYL CITRATE 25 MICROGRAM(S): 50 INJECTION INTRAVENOUS at 18:23

## 2017-08-25 RX ADMIN — SODIUM CHLORIDE 100 MILLILITER(S): 9 INJECTION, SOLUTION INTRAVENOUS at 18:22

## 2017-08-25 RX ADMIN — Medication 100 MILLIGRAM(S): at 18:23

## 2017-08-25 RX ADMIN — CELECOXIB 400 MILLIGRAM(S): 200 CAPSULE ORAL at 09:53

## 2017-08-25 RX ADMIN — Medication 1000 MILLIGRAM(S): at 21:48

## 2017-08-25 RX ADMIN — GABAPENTIN 300 MILLIGRAM(S): 400 CAPSULE ORAL at 09:52

## 2017-08-25 RX ADMIN — Medication 300 MILLIGRAM(S): at 23:27

## 2017-08-25 RX ADMIN — OXYCODONE HYDROCHLORIDE 20 MILLIGRAM(S): 5 TABLET ORAL at 10:23

## 2017-08-25 RX ADMIN — FENTANYL CITRATE 25 MICROGRAM(S): 50 INJECTION INTRAVENOUS at 18:15

## 2017-08-25 RX ADMIN — Medication 100 MILLIGRAM(S): at 21:03

## 2017-08-25 RX ADMIN — FENTANYL CITRATE 25 MICROGRAM(S): 50 INJECTION INTRAVENOUS at 18:48

## 2017-08-25 RX ADMIN — Medication 400 MILLIGRAM(S): at 21:06

## 2017-08-25 NOTE — PHYSICAL THERAPY INITIAL EVALUATION ADULT - ADDITIONAL COMMENTS
Pt reports living with two adult sons in a private house 4 steps to enter with rails, no stairs inside. Independent at baseline, no device. Did use a straight cane on occasion. Also owns axillary crutches.

## 2017-08-25 NOTE — CHART NOTE - NSCHARTNOTEFT_GEN_A_CORE
Knee xrays reviewed. Implants are in appropriate position. No fracture or dislocation noted. Patient is WBAT of the surgical extremity. Pt made get out of bed with physical therapy.

## 2017-08-25 NOTE — CONSULT NOTE ADULT - ASSESSMENT
67y/o female with PMH HTN, HLD, DM2, GERD, osteoarthritis presented for medical clearance for Right Knee Total Joint Replacement. Patient reports she has had right knee pain for 10years with worsening pain and difficulty ambulating, now using cane.  Patient has had cortisone injections in the past with limited relief.

## 2017-08-25 NOTE — PHYSICAL THERAPY INITIAL EVALUATION ADULT - ACTIVE RANGE OF MOTION EXAMINATION, REHAB EVAL
Left LE Active ROM was WFL (within functional limits)/bilateral upper extremity Active ROM was WFL (within functional limits)/right knee flexion to approx 70 degrees AROM

## 2017-08-25 NOTE — DISCHARGE NOTE ADULT - PATIENT PORTAL LINK FT
“You can access the [facility] Patient Portal, offered by [facility], by registering with the following website: [email or web address]”

## 2017-08-25 NOTE — DISCHARGE NOTE ADULT - HOSPITAL COURSE
The patient underwent a RIGHT TOTAL KNEE REPLACEMENT on 8/25/17. The patient received antibiotics consistent with SCIP guidelines. The patient underwent the procedure and had no intra-operative complications. Post-operatively, the patient was seen by medicine and PT. The patient received ASPIRIN for DVTP. The patient received pain medications per orthopedic pain managment protocol and the pain was appropriately controlled. The patient did not have any post-operative medical complications. The patient was discharged in stable condition.

## 2017-08-25 NOTE — PROGRESS NOTE ADULT - SUBJECTIVE AND OBJECTIVE BOX
CRISTAL BRISENO    [id number]    History: 66y Female is status post right total knee arthroplasty on 8/25/17, POD # 0. Patient is doing well and is comfortable. The patient's pain is controlled using the prescribed pain medications. The patient is participating in physical therapy. Denies nausea, vomiting, chest pain, shortness of breath, abdominal pain or fever. No new complaints.    MEDICATIONS  (STANDING):  lactated ringers. 1000 milliLiter(s) (100 mL/Hr) IV Continuous <Continuous>  vancomycin  IVPB 1500 milliGRAM(s) IV Intermittent <User Schedule>  ceFAZolin   IVPB 2000 milliGRAM(s) IV Intermittent <User Schedule>  oxyCODONE  ER Tablet 10 milliGRAM(s) Oral every 12 hours    MEDICATIONS  (PRN):  fentaNYL    Injectable 25 MICROGram(s) IV Push every 5 minutes PRN Moderate Pain  HYDROmorphone  Injectable 0.5 milliGRAM(s) IV Push every 10 minutes PRN Moderate Pain  ondansetron Injectable 4 milliGRAM(s) IV Push once PRN Nausea and/or Vomiting      Physical exam: The right knee dressing is clean, dry and intact. The calf is supple nontender. Passive range of motion is acceptable to due postoperative pain. Sensation to light touch is grossly intact distally. Motor function distally is 5/5. Extensor hallucis longus and flexor hallucis longus are intact. No foot drop. 2+ dorsalis pedis pulse. Capillary refill is less than 2 seconds. No cyanosis.    Primary Orthopedic Assessment:  • s/p RIGHT total knee replacement     Plan:   • DVT prophylaxis as prescribed, including use of compression devices and ankle pumps  • Continue physical therapy  • Weightbearing as tolerated of the right lower extremity with assistance of a walker  • Incentive spirometry encouraged  • Pain control as clinically indicated  • Discharge planning – anticipated discharge is Home

## 2017-08-25 NOTE — DISCHARGE NOTE ADULT - MEDICATION SUMMARY - MEDICATIONS TO TAKE
I will START or STAY ON the medications listed below when I get home from the hospital:    aspirin 325 mg oral delayed release tablet  -- 1 tab(s) by mouth 2 times a day for clot prevention  -- Indication: For clot prevention    oxyCODONE 5 mg oral tablet  -- 1-2 tab(s) by mouth every 3-4 hours, As needed, Mild to moderte Pain MDD:ten  -- Indication: For Pain    acetaminophen 325 mg oral tablet  -- 3 tab(s) by mouth every 8 hours, As needed, for supplemental pain control  -- Indication: For Pain    lisinopril 40 mg oral tablet  -- 1 tab(s) by mouth once a day  -- Indication: For home med    metFORMIN 500 mg oral tablet  -- 1 tab(s) by mouth 2 times a day  -- Indication: For home med    rosuvastatin 20 mg oral tablet  -- 1 tab(s) by mouth once a day (at bedtime)  -- Indication: For home med    docusate sodium 100 mg oral capsule  -- 1 cap(s) by mouth 3 times a day  -- Indication: For constipation    omeprazole 20 mg oral delayed release tablet  -- 1 tab(s) by mouth once a day  -- Indication: For home med    Hair, Skin & Nails 5 mg oral capsule  -- 1 cap(s) by mouth once a day  -- Indication: For home med

## 2017-08-25 NOTE — CONSULT NOTE ADULT - SUBJECTIVE AND OBJECTIVE BOX
PMD : Stefan  Cardio : Jim      CC: Right knee pain [id number]     HPI:  67y/o female with PMH HTN, HLD, DM2, GERD, osteoarthritis presented for medical clearance for Right Knee Total Joint Replacement. Patient reports she has had right knee pain for 10years with worsening pain and difficulty ambulating, now using cane.  Patient has had cortisone injections in the past with limited relief, underwent elective Knee replacement POD #0 seen in PACU       PAST MEDICAL & SURGICAL HISTORY:  Sleep apnea  Osteoarthritis  GERD (gastroesophageal reflux disease)  Hyperlipidemia  Diabetes mellitus  Hypertension  History of ovarian cystectomy  History of shoulder surgery: Left      Social History:  Tabacco - denies  ETOH - denies  Illicit drug abuse - denies    FAMILY HISTORY:  Family history of diabetes mellitus (Father)  Family history of coronary artery disease (Father)  Family history of hypertension (Mother)  Family history of renal failure (Mother, Sibling, Sibling)      Allergies    No Known Allergies    Intolerances        HOME MEDICATIONS :   Lisinopril 40mg PO daily  Metformin 500mg PO BID  Omeprazole 20mg PO daily  Crestor 20mg PO qHS  ASA 81mg PO daily  Norvasc 5mg PO daily    REVIEW OF SYSTEMS:    CONSTITUTIONAL: No fever  RESPIRATORY: No cough, wheezing No shortness of breath  CARDIOVASCULAR: No chest pain, palpitations  GASTROINTESTINAL: No abdominal or epigastric pain. No nausea, vomiting,  NEUROLOGICAL: No headaches  HEME/LYMPH: No easy bruising, or bleeding gums      MEDICATIONS  : reviewed      Vital Signs Last 24 Hrs  T(C): 36.4 (25 Aug 2017 09:34), Max: 36.4 (25 Aug 2017 09:34)  T(F): 97.5 (25 Aug 2017 09:34), Max: 97.5 (25 Aug 2017 09:34)  HR: 72 (25 Aug 2017 09:34) (72 - 72)  BP: 155/72 (25 Aug 2017 09:34) (155/72 - 155/72)  BP(mean): --  RR: 16 (25 Aug 2017 09:34) (16 - 16)  SpO2: 97% (25 Aug 2017 09:34) (97% - 97%)    PHYSICAL EXAM:    GENERAL: NAD, well-groomed, well-developed  HEAD:  Atraumatic, Normocephalic  EYES: EOMI, PERRLA, conjunctiva and sclera clear  NECK: Supple, No JVD, Normal thyroid  NERVOUS SYSTEM:  Alert & Oriented X3, Good concentration; Motor Strength 5/5 B/L upper and lower extremities; DTRs 2+ intact and symmetric  CHEST/LUNG: CTA  b/l,  no rales, rhonchi, wheezing, or rubs  HEART: Regular rate and rhythm; No murmurs, rubs, or gallops  ABDOMEN: Soft, Nontender, Nondistended; Bowel sounds present  EXTREMITIES:  2+ Peripheral Pulses, No clubbing, cyanosis, or edema ,   LYMPH: No lymphadenopathy noted  SKIN: No rashes or lesions    LABS:      from outpt - reviewed  EKG - reviewed        RADIOLOGY & ADDITIONAL STUDIES:

## 2017-08-25 NOTE — DISCHARGE NOTE ADULT - MEDICATION SUMMARY - MEDICATIONS TO STOP TAKING
I will STOP taking the medications listed below when I get home from the hospital:    Aleve 220 mg oral tablet  -- 1 tab(s) by mouth every 8 hours, As Needed - for severe pain

## 2017-08-25 NOTE — DISCHARGE NOTE ADULT - CARE PROVIDER_API CALL
Jorge Luis Reyes (MD), Orthopaedic Surgery  [address]  [place] [address]  Phone: [phone]  Fax: [phone]

## 2017-08-25 NOTE — CONSULT NOTE ADULT - PROBLEM SELECTOR RECOMMENDATION 9
s/p right knee replacement 8/25/17  Antibiotics per ortho  Pain control  bowel regimen  PT eval   Incentive spirometry

## 2017-08-25 NOTE — DISCHARGE NOTE ADULT - PLAN OF CARE
improve pain and function The patient will be seen in the office in 2 weeks for wound check. Tape will be removed at that time. Patient may shower after post-op day #3. The dressing is to be removed on 9/5/17. IF THE DRESSING BECOMES SOILED BEFORE THE REMOVAL DATE, CHANGE WITH A SIMILAR DRESSING. IF THE DRESSING BECOMES STAINED WITH DISCHARGE, CONTACT THE OFFICE FOR FURTHER DIRECTIONS. The patient will contact the office if the wound becomes red, has increasing pain, develops bleeding or discharge, an injury occurs, or has other concerns. The patient will continue PT consistent with total knee replacement. The patient will continue aspirin 325mg twice daily for 6 weeks for blood clot prevention. The patient will take OXYCODONE AND TYLENOL for pain control and titrate according to prescription and patient needs. The patient will take Colace while taking oxycodone to prevent narcotic associated constipation.  Additionally, increase water intake (drink at least 8 glasses of water daily) and try adding fiber to the diet by eating fruits, vegetables and foods that are rich in grains. If constipation is experienced, contact the medical/primary care provider to discuss further treatment options. The patient is FULL weight bearing. Elevation of the lower leg is recommended to reduce swelling.

## 2017-08-25 NOTE — DISCHARGE NOTE ADULT - CARE PLAN
Principal Discharge DX:	Primary osteoarthritis of right knee  Goal:	improve pain and function  Instructions for follow-up, activity and diet:	The patient will be seen in the office in 2 weeks for wound check. Tape will be removed at that time. Patient may shower after post-op day #3. The dressing is to be removed on 9/5/17. IF THE DRESSING BECOMES SOILED BEFORE THE REMOVAL DATE, CHANGE WITH A SIMILAR DRESSING. IF THE DRESSING BECOMES STAINED WITH DISCHARGE, CONTACT THE OFFICE FOR FURTHER DIRECTIONS. The patient will contact the office if the wound becomes red, has increasing pain, develops bleeding or discharge, an injury occurs, or has other concerns. The patient will continue PT consistent with total knee replacement. The patient will continue aspirin 325mg twice daily for 6 weeks for blood clot prevention. The patient will take OXYCODONE AND TYLENOL for pain control and titrate according to prescription and patient needs. The patient will take Colace while taking oxycodone to prevent narcotic associated constipation.  Additionally, increase water intake (drink at least 8 glasses of water daily) and try adding fiber to the diet by eating fruits, vegetables and foods that are rich in grains. If constipation is experienced, contact the medical/primary care provider to discuss further treatment options. The patient is FULL weight bearing. Elevation of the lower leg is recommended to reduce swelling.

## 2017-08-26 LAB
ANION GAP SERPL CALC-SCNC: 12 MMOL/L — SIGNIFICANT CHANGE UP (ref 5–17)
BUN SERPL-MCNC: 13 MG/DL — SIGNIFICANT CHANGE UP (ref 8–20)
CALCIUM SERPL-MCNC: 8.7 MG/DL — SIGNIFICANT CHANGE UP (ref 8.6–10.2)
CHLORIDE SERPL-SCNC: 102 MMOL/L — SIGNIFICANT CHANGE UP (ref 98–107)
CO2 SERPL-SCNC: 25 MMOL/L — SIGNIFICANT CHANGE UP (ref 22–29)
CREAT SERPL-MCNC: 1.02 MG/DL — SIGNIFICANT CHANGE UP (ref 0.5–1.3)
GLUCOSE SERPL-MCNC: 120 MG/DL — HIGH (ref 70–115)
HCT VFR BLD CALC: 30.3 % — LOW (ref 37–47)
HGB BLD-MCNC: 9.9 G/DL — LOW (ref 12–16)
MCHC RBC-ENTMCNC: 28 PG — SIGNIFICANT CHANGE UP (ref 27–31)
MCHC RBC-ENTMCNC: 32.7 G/DL — SIGNIFICANT CHANGE UP (ref 32–36)
MCV RBC AUTO: 85.6 FL — SIGNIFICANT CHANGE UP (ref 81–99)
PLATELET # BLD AUTO: 281 K/UL — SIGNIFICANT CHANGE UP (ref 150–400)
POTASSIUM SERPL-MCNC: 4.8 MMOL/L — SIGNIFICANT CHANGE UP (ref 3.5–5.3)
POTASSIUM SERPL-SCNC: 4.8 MMOL/L — SIGNIFICANT CHANGE UP (ref 3.5–5.3)
RBC # BLD: 3.54 M/UL — LOW (ref 4.4–5.2)
RBC # FLD: 14.5 % — SIGNIFICANT CHANGE UP (ref 11–15.6)
SODIUM SERPL-SCNC: 139 MMOL/L — SIGNIFICANT CHANGE UP (ref 135–145)
WBC # BLD: 7.9 K/UL — SIGNIFICANT CHANGE UP (ref 4.8–10.8)
WBC # FLD AUTO: 7.9 K/UL — SIGNIFICANT CHANGE UP (ref 4.8–10.8)

## 2017-08-26 PROCEDURE — 99233 SBSQ HOSP IP/OBS HIGH 50: CPT

## 2017-08-26 RX ORDER — SODIUM CHLORIDE 9 MG/ML
1000 INJECTION, SOLUTION INTRAVENOUS
Qty: 0 | Refills: 0 | Status: DISCONTINUED | OUTPATIENT
Start: 2017-08-26 | End: 2017-08-26

## 2017-08-26 RX ORDER — LISINOPRIL 2.5 MG/1
40 TABLET ORAL DAILY
Qty: 0 | Refills: 0 | Status: DISCONTINUED | OUTPATIENT
Start: 2017-08-26 | End: 2017-08-27

## 2017-08-26 RX ADMIN — OXYCODONE HYDROCHLORIDE 10 MILLIGRAM(S): 5 TABLET ORAL at 05:54

## 2017-08-26 RX ADMIN — ATORVASTATIN CALCIUM 20 MILLIGRAM(S): 80 TABLET, FILM COATED ORAL at 23:14

## 2017-08-26 RX ADMIN — OXYCODONE HYDROCHLORIDE 10 MILLIGRAM(S): 5 TABLET ORAL at 11:04

## 2017-08-26 RX ADMIN — Medication 325 MILLIGRAM(S): at 05:52

## 2017-08-26 RX ADMIN — OXYCODONE HYDROCHLORIDE 10 MILLIGRAM(S): 5 TABLET ORAL at 06:52

## 2017-08-26 RX ADMIN — SODIUM CHLORIDE 80 MILLILITER(S): 9 INJECTION, SOLUTION INTRAVENOUS at 10:04

## 2017-08-26 RX ADMIN — Medication 100 MILLIGRAM(S): at 23:15

## 2017-08-26 RX ADMIN — HYDROMORPHONE HYDROCHLORIDE 0.5 MILLIGRAM(S): 2 INJECTION INTRAMUSCULAR; INTRAVENOUS; SUBCUTANEOUS at 23:55

## 2017-08-26 RX ADMIN — Medication 325 MILLIGRAM(S): at 17:39

## 2017-08-26 RX ADMIN — OXYCODONE HYDROCHLORIDE 10 MILLIGRAM(S): 5 TABLET ORAL at 04:59

## 2017-08-26 RX ADMIN — OXYCODONE HYDROCHLORIDE 10 MILLIGRAM(S): 5 TABLET ORAL at 23:15

## 2017-08-26 RX ADMIN — OXYCODONE HYDROCHLORIDE 10 MILLIGRAM(S): 5 TABLET ORAL at 10:04

## 2017-08-26 RX ADMIN — METFORMIN HYDROCHLORIDE 500 MILLIGRAM(S): 850 TABLET ORAL at 10:04

## 2017-08-26 RX ADMIN — Medication 15 MILLIGRAM(S): at 05:52

## 2017-08-26 RX ADMIN — OXYCODONE HYDROCHLORIDE 10 MILLIGRAM(S): 5 TABLET ORAL at 20:04

## 2017-08-26 RX ADMIN — Medication 100 MILLIGRAM(S): at 01:06

## 2017-08-26 RX ADMIN — OXYCODONE HYDROCHLORIDE 10 MILLIGRAM(S): 5 TABLET ORAL at 20:06

## 2017-08-26 RX ADMIN — Medication 15 MILLIGRAM(S): at 06:07

## 2017-08-26 RX ADMIN — OXYCODONE HYDROCHLORIDE 10 MILLIGRAM(S): 5 TABLET ORAL at 23:17

## 2017-08-26 RX ADMIN — Medication 100 MILLIGRAM(S): at 05:52

## 2017-08-26 RX ADMIN — Medication 100 MILLIGRAM(S): at 17:39

## 2017-08-26 RX ADMIN — OXYCODONE HYDROCHLORIDE 10 MILLIGRAM(S): 5 TABLET ORAL at 05:52

## 2017-08-26 NOTE — PROGRESS NOTE ADULT - ASSESSMENT
65y/o female with PMH HTN, HLD, DM2, GERD, osteoarthritis , s/p     Problem/Recommendation - 1:  Problem: Primary osteoarthritis of right knee. Recommendation: s/p right knee replacement 8/25/17  Antibiotics per ortho  Pain control  bowel regimen  PT eval   Incentive spirometry.    Problem/Recommendation - 2:  ·  Problem: Essential hypertension.  Recommendation: Continue Lisinopril and Norvasc with holding parameters.     Problem/Recommendation - 3:  ·  Problem: Sleep apnea, unspecified type.  Recommendation: Not on CPAP    overnight   head elevation 30degree.     Problem/Recommendation - 4:  ·  Problem: Diabetes mellitus.  Recommendation: Type 2 , well controlled . Hgb A1c 6.4   Hold Metformin   sliding scale coverage for now.     Problem/Recommendation - 5:  ·  Problem: Hyperlipidemia, unspecified hyperlipidemia type.  Recommendation: Continue statin.     Problem/Recommendation - 6:  Problem: Gastroesophageal reflux disease without esophagitis. Recommendation: Continue PPI.    Problem/Recommendation - 7:  Problem: Obesity, Class II, BMI 35-39.9. Recommendation: diet eval  weight loss. 65y/o female with PMH HTN, HLD, DM2, GERD, osteoarthritis , s/p R TKA , POD # 1    Problem/Recommendation - 1:  Problem: Primary osteoarthritis of right knee. Recommendation: s/p right knee replacement 8/25/17  Antibiotics per ortho  Pain control  bowel regimen  PT eval   Incentive spirometry.    Problem/Recommendation - 2:  ·  Problem: Essential hypertension.  Recommendation: Continue Lisinopril and Norvasc with holding parameters.     Problem/Recommendation - 3:  ·  Problem: Sleep apnea, unspecified type.  Recommendation: Not on CPAP    overnight   head elevation 30degree.     Problem/Recommendation - 4:  ·  Problem: Diabetes mellitus.  Recommendation: Type 2 , well controlled . Hgb A1c 6.4   Hold Metformin , may restart on d/c home .  sliding scale coverage for now.     Problem/Recommendation - 5:  ·  Problem: Hyperlipidemia, unspecified hyperlipidemia type.  Recommendation: Continue statin.     Problem/Recommendation - 6:  Problem: Gastroesophageal reflux disease without esophagitis. Recommendation: Continue PPI.    Problem/Recommendation - 7:  Problem: ABLA - likely on anemia of chronic dz. Recommendation; asymptomatic , follow CBC in am

## 2017-08-26 NOTE — PROGRESS NOTE ADULT - SUBJECTIVE AND OBJECTIVE BOX
Patient seen and examined . S/p  , POD #     CC :     HPI:      PAST MEDICAL & SURGICAL HISTORY:  Sleep apnea  Osteoarthritis  GERD (gastroesophageal reflux disease)  Hyperlipidemia  Diabetes mellitus  Hypertension  History of ovarian cystectomy  History of shoulder surgery: Left      MEDICATIONS  (STANDING):  lactated ringers. 1000 milliLiter(s) (80 mL/Hr) IV Continuous <Continuous>  aspirin enteric coated 325 milliGRAM(s) Oral two times a day  docusate sodium 100 milliGRAM(s) Oral three times a day  oxyCODONE  ER Tablet 10 milliGRAM(s) Oral every 12 hours  lisinopril 40 milliGRAM(s) Oral daily  metFORMIN 500 milliGRAM(s) Oral every 12 hours  atorvastatin 20 milliGRAM(s) Oral at bedtime  insulin lispro (HumaLOG) corrective regimen sliding scale   SubCutaneous three times a day before meals  dextrose 5%. 1000 milliLiter(s) (50 mL/Hr) IV Continuous <Continuous>  dextrose 50% Injectable 12.5 Gram(s) IV Push once  dextrose 50% Injectable 25 Gram(s) IV Push once  dextrose 50% Injectable 25 Gram(s) IV Push once    MEDICATIONS  (PRN):  acetaminophen   Tablet 975 milliGRAM(s) Oral every 8 hours PRN For Temp over 38.3 C (100.94 F)  oxyCODONE    IR 5 milliGRAM(s) Oral every 3 hours PRN Mild Pain  oxyCODONE    IR 10 milliGRAM(s) Oral every 3 hours PRN Moderate Pain  HYDROmorphone  Injectable 0.5 milliGRAM(s) IV Push every 4 hours PRN Breakthrough Pain  aluminum hydroxide/magnesium hydroxide/simethicone Suspension 30 milliLiter(s) Oral four times a day PRN Indigestion  ondansetron Injectable 4 milliGRAM(s) IV Push every 6 hours PRN Nausea and/or Vomiting  magnesium hydroxide Suspension 30 milliLiter(s) Oral daily PRN Constipation  senna 2 Tablet(s) Oral at bedtime PRN Constipation  dextrose Gel 1 Dose(s) Oral once PRN Blood Glucose LESS THAN 70 milliGRAM(s)/deciliter  glucagon  Injectable 1 milliGRAM(s) IntraMuscular once PRN Glucose LESS THAN 70 milligrams/deciliter      LABS:                          9.9    7.9   )-----------( 281      ( 26 Aug 2017 05:22 )             30.3     08-26    139  |  102  |  13.0  ----------------------------<  120<H>  4.8   |  25.0  |  1.02    Ca    8.7      26 Aug 2017 05:22            RADIOLOGY & ADDITIONAL TESTS:      REVIEW OF SYSTEMS:    CONSTITUTIONAL: No fever, weight loss, or fatigue  EYES: No eye pain, visual disturbances, or discharge  ENMT:  No difficulty hearing, tinnitus, vertigo; No sinus or throat pain  NECK: No pain or stiffness  RESPIRATORY: No cough, wheezing, chills or hemoptysis; No shortness of breath  CARDIOVASCULAR: No chest pain, palpitations, dizziness, or leg swelling  GASTROINTESTINAL: No abdominal or epigastric pain. No nausea, vomiting, or hematemesis; No diarrhea or constipation. No melena or hematochezia.  GENITOURINARY: No dysuria, frequency, hematuria, or incontinence  NEUROLOGICAL: No headaches, memory loss, loss of strength, numbness, or tremors  SKIN: No itching, burning, rashes, or lesions   LYMPH NODES: No enlarged glands  ENDOCRINE: No heat or cold intolerance; No hair loss  MUSCULOSKELETAL: R knee pain , well controlled   PSYCHIATRIC: No depression, anxiety, mood swings, or difficulty sleeping  HEME/LYMPH: No easy bruising, or bleeding gums  ALLERGY AND IMMUNOLOGIC: No hives or eczema    Vital Signs Last 24 Hrs  T(C): 36.6 (26 Aug 2017 08:23), Max: 36.6 (26 Aug 2017 08:23)  T(F): 97.8 (26 Aug 2017 08:23), Max: 97.8 (26 Aug 2017 08:23)  HR: 71 (26 Aug 2017 11:56) (51 - 71)  BP: 110/70 (26 Aug 2017 11:56) (91/60 - 150/64)  BP(mean): 74 (25 Aug 2017 18:45) (74 - 87)  RR: 16 (26 Aug 2017 11:56) (16 - 21)  SpO2: 92% (26 Aug 2017 11:56) (92% - 100%)  PHYSICAL EXAM:    GENERAL: NAD, well-groomed, well-developed  HEAD:  Atraumatic, Normocephalic  EYES: EOMI, PERRLA, conjunctiva and sclera clear  NECK: Supple, No JVD, Normal thyroid  NERVOUS SYSTEM:  Alert & Oriented X3, no focal deficit  CHEST/LUNG: CTA b/l ,  no  rales, rhonchi, wheezing, or rubs  HEART: Regular rate and rhythm; No murmurs, rubs, or gallops  ABDOMEN: Soft, Nontender, Nondistended; Bowel sounds present  EXTREMITIES:  2+ Peripheral Pulses, No clubbing, cyanosis, or edema , R knee dressing + , clean and dry   LYMPH: No lymphadenopathy noted  SKIN: No rashes or lesions Patient seen and examined . S/p  R TKA  , POD # 1    CC : R knee pain , well controlled , no other complaints       PAST MEDICAL & SURGICAL HISTORY:  Sleep apnea  Osteoarthritis  GERD (gastroesophageal reflux disease)  Hyperlipidemia  Diabetes mellitus  Hypertension  History of ovarian cystectomy  History of shoulder surgery: Left      MEDICATIONS  (STANDING):  lactated ringers. 1000 milliLiter(s) (80 mL/Hr) IV Continuous <Continuous>  aspirin enteric coated 325 milliGRAM(s) Oral two times a day  docusate sodium 100 milliGRAM(s) Oral three times a day  oxyCODONE  ER Tablet 10 milliGRAM(s) Oral every 12 hours  lisinopril 40 milliGRAM(s) Oral daily  metFORMIN 500 milliGRAM(s) Oral every 12 hours  atorvastatin 20 milliGRAM(s) Oral at bedtime  insulin lispro (HumaLOG) corrective regimen sliding scale   SubCutaneous three times a day before meals  dextrose 5%. 1000 milliLiter(s) (50 mL/Hr) IV Continuous <Continuous>  dextrose 50% Injectable 12.5 Gram(s) IV Push once  dextrose 50% Injectable 25 Gram(s) IV Push once  dextrose 50% Injectable 25 Gram(s) IV Push once    MEDICATIONS  (PRN):  acetaminophen   Tablet 975 milliGRAM(s) Oral every 8 hours PRN For Temp over 38.3 C (100.94 F)  oxyCODONE    IR 5 milliGRAM(s) Oral every 3 hours PRN Mild Pain  oxyCODONE    IR 10 milliGRAM(s) Oral every 3 hours PRN Moderate Pain  HYDROmorphone  Injectable 0.5 milliGRAM(s) IV Push every 4 hours PRN Breakthrough Pain  aluminum hydroxide/magnesium hydroxide/simethicone Suspension 30 milliLiter(s) Oral four times a day PRN Indigestion  ondansetron Injectable 4 milliGRAM(s) IV Push every 6 hours PRN Nausea and/or Vomiting  magnesium hydroxide Suspension 30 milliLiter(s) Oral daily PRN Constipation  senna 2 Tablet(s) Oral at bedtime PRN Constipation  dextrose Gel 1 Dose(s) Oral once PRN Blood Glucose LESS THAN 70 milliGRAM(s)/deciliter  glucagon  Injectable 1 milliGRAM(s) IntraMuscular once PRN Glucose LESS THAN 70 milligrams/deciliter      LABS:                          9.9    7.9   )-----------( 281      ( 26 Aug 2017 05:22 )             30.3     08-26    139  |  102  |  13.0  ----------------------------<  120<H>  4.8   |  25.0  |  1.02    Ca    8.7      26 Aug 2017 05:22      RADIOLOGY & ADDITIONAL TESTS:    < from: Xray Knee 1 or 2 Views, Right (08.25.17 @ 16:03) >     EXAM:  KNEE-RIGHT                          PROCEDURE DATE:  08/25/2017          INTERPRETATION:  Radiographs of the right knee         CLINICAL INFORMATION:  Postop    TECHNIQUE:   Frontal, and cross table lateral views of the knee were   obtained.     FINDINGS:  No prior exams are available for comparison.    Patient has undergone a total knee replacement. The hardware appears   intact and aligned. There is no evidence of acute fracture. There are   postoperative changes in the soft tissues.    IMPRESSION:   Unremarkable knee radiographs status post total knee   replacement.                    REYES TOMLIN   This document has been electronically signed. Aug 25 2017  4:46PM    < end of copied text >        REVIEW OF SYSTEMS:    CONSTITUTIONAL: No fever, weight loss, or fatigue  EYES: No eye pain, visual disturbances, or discharge  ENMT:  No difficulty hearing, tinnitus, vertigo; No sinus or throat pain  NECK: No pain or stiffness  RESPIRATORY: No cough, wheezing, chills or hemoptysis; No shortness of breath  CARDIOVASCULAR: No chest pain, palpitations, dizziness, or leg swelling  GASTROINTESTINAL: No abdominal or epigastric pain. No nausea, vomiting, or hematemesis; No diarrhea or constipation. No melena or hematochezia.  GENITOURINARY: No dysuria, frequency, hematuria, or incontinence  NEUROLOGICAL: No headaches, memory loss, loss of strength, numbness, or tremors  SKIN: No itching, burning, rashes, or lesions   LYMPH NODES: No enlarged glands  ENDOCRINE: No heat or cold intolerance; No hair loss  MUSCULOSKELETAL: R knee pain , well controlled   PSYCHIATRIC: No depression, anxiety, mood swings, or difficulty sleeping  HEME/LYMPH: No easy bruising, or bleeding gums  ALLERGY AND IMMUNOLOGIC: No hives or eczema    Vital Signs Last 24 Hrs  T(C): 36.6 (26 Aug 2017 08:23), Max: 36.6 (26 Aug 2017 08:23)  T(F): 97.8 (26 Aug 2017 08:23), Max: 97.8 (26 Aug 2017 08:23)  HR: 71 (26 Aug 2017 11:56) (51 - 71)  BP: 110/70 (26 Aug 2017 11:56) (91/60 - 150/64)  BP(mean): 74 (25 Aug 2017 18:45) (74 - 87)  RR: 16 (26 Aug 2017 11:56) (16 - 21)  SpO2: 92% (26 Aug 2017 11:56) (92% - 100%)  PHYSICAL EXAM:    GENERAL: NAD, well-groomed, well-developed  HEAD:  Atraumatic, Normocephalic  EYES: EOMI, PERRLA, conjunctiva and sclera clear  NECK: Supple, No JVD, Normal thyroid  NERVOUS SYSTEM:  Alert & Oriented X3, no focal deficit  CHEST/LUNG: CTA b/l ,  no  rales, rhonchi, wheezing, or rubs  HEART: Regular rate and rhythm; No murmurs, rubs, or gallops  ABDOMEN: Soft, Nontender, Nondistended; Bowel sounds present  EXTREMITIES:  2+ Peripheral Pulses, No clubbing, cyanosis, or edema , R knee dressing + , clean and dry   LYMPH: No lymphadenopathy noted  SKIN: No rashes or lesions

## 2017-08-26 NOTE — PROGRESS NOTE ADULT - SUBJECTIVE AND OBJECTIVE BOX
CRISTAL BRISENO    [id number]  Patient seen and examined, 66y Female is status post right total knee arthroplasty, POD # 1. Patient is doing well and is comfortable. The patient's pain is controlled using the prescribed pain medications. The patient is participating in physical therapy. Denies nausea, vomiting, chest pain, shortness of breath, abdominal pain, LE N/T. No new complaints.    Vital Signs Last 24 Hrs  T(C): 36.6 (26 Aug 2017 08:23), Max: 36.6 (26 Aug 2017 08:23)  T(F): 97.8 (26 Aug 2017 08:23), Max: 97.8 (26 Aug 2017 08:23)  HR: 62 (26 Aug 2017 08:23) (51 - 68)  BP: 117/64 (26 Aug 2017 08:23) (91/60 - 150/64)  BP(mean): 74 (25 Aug 2017 18:45) (74 - 87)  RR: 18 (26 Aug 2017 08:23) (16 - 21)  SpO2: 98% (26 Aug 2017 08:23) (96% - 100%)                              9.9    7.9   )-----------( 281      ( 26 Aug 2017 05:22 )             30.3     08-26    139  |  102  |  13.0  ----------------------------<  120<H>  4.8   |  25.0  |  1.02    Ca    8.7      26 Aug 2017 05:22        MEDICATIONS  (STANDING):  lactated ringers. 1000 milliLiter(s) (80 mL/Hr) IV Continuous <Continuous>  aspirin enteric coated 325 milliGRAM(s) Oral two times a day  docusate sodium 100 milliGRAM(s) Oral three times a day  oxyCODONE  ER Tablet 10 milliGRAM(s) Oral every 12 hours  lisinopril 40 milliGRAM(s) Oral daily  metFORMIN 500 milliGRAM(s) Oral every 12 hours  atorvastatin 20 milliGRAM(s) Oral at bedtime  insulin lispro (HumaLOG) corrective regimen sliding scale   SubCutaneous three times a day before meals  dextrose 5%. 1000 milliLiter(s) (50 mL/Hr) IV Continuous <Continuous>  dextrose 50% Injectable 12.5 Gram(s) IV Push once  dextrose 50% Injectable 25 Gram(s) IV Push once  dextrose 50% Injectable 25 Gram(s) IV Push once    MEDICATIONS  (PRN):  acetaminophen   Tablet 975 milliGRAM(s) Oral every 8 hours PRN For Temp over 38.3 C (100.94 F)  oxyCODONE    IR 5 milliGRAM(s) Oral every 3 hours PRN Mild Pain  oxyCODONE    IR 10 milliGRAM(s) Oral every 3 hours PRN Moderate Pain  HYDROmorphone  Injectable 0.5 milliGRAM(s) IV Push every 4 hours PRN Breakthrough Pain  aluminum hydroxide/magnesium hydroxide/simethicone Suspension 30 milliLiter(s) Oral four times a day PRN Indigestion  ondansetron Injectable 4 milliGRAM(s) IV Push every 6 hours PRN Nausea and/or Vomiting  magnesium hydroxide Suspension 30 milliLiter(s) Oral daily PRN Constipation  senna 2 Tablet(s) Oral at bedtime PRN Constipation  dextrose Gel 1 Dose(s) Oral once PRN Blood Glucose LESS THAN 70 milliGRAM(s)/deciliter  glucagon  Injectable 1 milliGRAM(s) IntraMuscular once PRN Glucose LESS THAN 70 milligrams/deciliter      Physical exam: The right knee dressing is clean, dry and intact. No drainage or discharge. No erythema is noted. No blistering. No ecchymosis. The calf is supple nontender. Passive range of motion is acceptable to due postoperative pain. No calf tenderness. Sensation to light touch is grossly intact distally. Motor function distally is 5/5. Extensor hallucis longus and flexor hallucis longus are intact. No foot drop. 2+ dorsalis pedis pulse. Capillary refill is less than 2 seconds. No cyanosis.    Primary Orthopedic Assessment:  • s/p RIGHT total knee replacement, doing well    Secondary  Orthopedic Assessment(s):   •     Secondary  Medical Assessment(s):   •     Plan:   • DVT prophylaxis: ASA 325mg BID, use of compression devices and ankle pumps  • Continue physical therapy  • Weightbearing as tolerated of the right lower extremity with assistance of a walker  • Incentive spirometry encouraged  • Pain control as clinically indicated  • Discharge planning – anticipated discharge is Home when clears PT

## 2017-08-27 VITALS
RESPIRATION RATE: 16 BRPM | TEMPERATURE: 98 F | HEART RATE: 85 BPM | DIASTOLIC BLOOD PRESSURE: 74 MMHG | SYSTOLIC BLOOD PRESSURE: 152 MMHG | OXYGEN SATURATION: 96 %

## 2017-08-27 LAB
ANION GAP SERPL CALC-SCNC: 10 MMOL/L — SIGNIFICANT CHANGE UP (ref 5–17)
BUN SERPL-MCNC: 13 MG/DL — SIGNIFICANT CHANGE UP (ref 8–20)
CALCIUM SERPL-MCNC: 8.7 MG/DL — SIGNIFICANT CHANGE UP (ref 8.6–10.2)
CHLORIDE SERPL-SCNC: 103 MMOL/L — SIGNIFICANT CHANGE UP (ref 98–107)
CO2 SERPL-SCNC: 25 MMOL/L — SIGNIFICANT CHANGE UP (ref 22–29)
CREAT SERPL-MCNC: 1.04 MG/DL — SIGNIFICANT CHANGE UP (ref 0.5–1.3)
GLUCOSE SERPL-MCNC: 140 MG/DL — HIGH (ref 70–115)
HCT VFR BLD CALC: 28.1 % — LOW (ref 37–47)
HGB BLD-MCNC: 9.3 G/DL — LOW (ref 12–16)
MCHC RBC-ENTMCNC: 27.9 PG — SIGNIFICANT CHANGE UP (ref 27–31)
MCHC RBC-ENTMCNC: 33.1 G/DL — SIGNIFICANT CHANGE UP (ref 32–36)
MCV RBC AUTO: 84.4 FL — SIGNIFICANT CHANGE UP (ref 81–99)
PLATELET # BLD AUTO: 235 K/UL — SIGNIFICANT CHANGE UP (ref 150–400)
POTASSIUM SERPL-MCNC: 4.5 MMOL/L — SIGNIFICANT CHANGE UP (ref 3.5–5.3)
POTASSIUM SERPL-SCNC: 4.5 MMOL/L — SIGNIFICANT CHANGE UP (ref 3.5–5.3)
RBC # BLD: 3.33 M/UL — LOW (ref 4.4–5.2)
RBC # FLD: 14.6 % — SIGNIFICANT CHANGE UP (ref 11–15.6)
SODIUM SERPL-SCNC: 138 MMOL/L — SIGNIFICANT CHANGE UP (ref 135–145)
WBC # BLD: 7.6 K/UL — SIGNIFICANT CHANGE UP (ref 4.8–10.8)
WBC # FLD AUTO: 7.6 K/UL — SIGNIFICANT CHANGE UP (ref 4.8–10.8)

## 2017-08-27 PROCEDURE — 97116 GAIT TRAINING THERAPY: CPT

## 2017-08-27 PROCEDURE — C1776: CPT

## 2017-08-27 PROCEDURE — 97530 THERAPEUTIC ACTIVITIES: CPT

## 2017-08-27 PROCEDURE — C1713: CPT

## 2017-08-27 PROCEDURE — 97110 THERAPEUTIC EXERCISES: CPT

## 2017-08-27 PROCEDURE — 36415 COLL VENOUS BLD VENIPUNCTURE: CPT

## 2017-08-27 PROCEDURE — 86901 BLOOD TYPING SEROLOGIC RH(D): CPT

## 2017-08-27 PROCEDURE — 73560 X-RAY EXAM OF KNEE 1 OR 2: CPT

## 2017-08-27 PROCEDURE — 80048 BASIC METABOLIC PNL TOTAL CA: CPT

## 2017-08-27 PROCEDURE — 88305 TISSUE EXAM BY PATHOLOGIST: CPT

## 2017-08-27 PROCEDURE — 85027 COMPLETE CBC AUTOMATED: CPT

## 2017-08-27 PROCEDURE — 86900 BLOOD TYPING SEROLOGIC ABO: CPT

## 2017-08-27 PROCEDURE — 99233 SBSQ HOSP IP/OBS HIGH 50: CPT

## 2017-08-27 PROCEDURE — 86850 RBC ANTIBODY SCREEN: CPT

## 2017-08-27 PROCEDURE — 88311 DECALCIFY TISSUE: CPT

## 2017-08-27 RX ORDER — DOCUSATE SODIUM 100 MG
1 CAPSULE ORAL
Qty: 30 | Refills: 0 | OUTPATIENT
Start: 2017-08-27 | End: 2017-09-06

## 2017-08-27 RX ORDER — ACETAMINOPHEN 500 MG
3 TABLET ORAL
Qty: 0 | Refills: 0 | COMMUNITY
Start: 2017-08-27

## 2017-08-27 RX ORDER — ASPIRIN/CALCIUM CARB/MAGNESIUM 324 MG
1 TABLET ORAL
Qty: 84 | Refills: 0 | OUTPATIENT
Start: 2017-08-27 | End: 2017-10-08

## 2017-08-27 RX ORDER — DIPHENHYDRAMINE HCL 50 MG
25 CAPSULE ORAL EVERY 6 HOURS
Qty: 0 | Refills: 0 | Status: DISCONTINUED | OUTPATIENT
Start: 2017-08-27 | End: 2017-08-27

## 2017-08-27 RX ORDER — OXYCODONE HYDROCHLORIDE 5 MG/1
1 TABLET ORAL
Qty: 50 | Refills: 0 | OUTPATIENT
Start: 2017-08-27

## 2017-08-27 RX ADMIN — Medication 25 MILLIGRAM(S): at 11:34

## 2017-08-27 RX ADMIN — Medication 100 MILLIGRAM(S): at 11:36

## 2017-08-27 RX ADMIN — OXYCODONE HYDROCHLORIDE 10 MILLIGRAM(S): 5 TABLET ORAL at 06:33

## 2017-08-27 RX ADMIN — OXYCODONE HYDROCHLORIDE 10 MILLIGRAM(S): 5 TABLET ORAL at 06:37

## 2017-08-27 RX ADMIN — HYDROMORPHONE HYDROCHLORIDE 0.5 MILLIGRAM(S): 2 INJECTION INTRAMUSCULAR; INTRAVENOUS; SUBCUTANEOUS at 00:01

## 2017-08-27 RX ADMIN — OXYCODONE HYDROCHLORIDE 10 MILLIGRAM(S): 5 TABLET ORAL at 06:08

## 2017-08-27 RX ADMIN — MAGNESIUM HYDROXIDE 30 MILLILITER(S): 400 TABLET, CHEWABLE ORAL at 09:42

## 2017-08-27 RX ADMIN — OXYCODONE HYDROCHLORIDE 10 MILLIGRAM(S): 5 TABLET ORAL at 06:36

## 2017-08-27 RX ADMIN — Medication 100 MILLIGRAM(S): at 06:08

## 2017-08-27 RX ADMIN — LISINOPRIL 40 MILLIGRAM(S): 2.5 TABLET ORAL at 06:08

## 2017-08-27 RX ADMIN — Medication 325 MILLIGRAM(S): at 06:08

## 2017-08-27 NOTE — PROGRESS NOTE ADULT - SUBJECTIVE AND OBJECTIVE BOX
CRISTAL BRISENO    [id number]    History: 66y Female is status post right total knee arthroplasty on 8/25/2017, POD # 2. Patient is doing well and is comfortable. The patient's pain is controlled using the prescribed pain medications. The patient is participating in physical therapy and out of bed today to chair. Denies nausea, vomiting, chest pain, shortness of breath, abdominal pain or fever. No new complaints.                            9.3    7.6   )-----------( 235      ( 27 Aug 2017 05:39 )             28.1     08-27    138  |  103  |  13.0  ----------------------------<  140<H>  4.5   |  25.0  |  1.04    Ca    8.7      27 Aug 2017 05:39      Vital Signs Last 24 Hrs  T(C): 37.6 (27 Aug 2017 08:13), Max: 37.9 (26 Aug 2017 15:40)  T(F): 99.6 (27 Aug 2017 08:13), Max: 100.3 (26 Aug 2017 15:40)  HR: 85 (27 Aug 2017 08:13) (71 - 85)  BP: 143/71 (27 Aug 2017 08:13) (108/62 - 143/71)  BP(mean): --  RR: 16 (27 Aug 2017 08:13) (16 - 18)  SpO2: 92% (27 Aug 2017 08:13) (92% - 94%)        MEDICATIONS  (STANDING):  aspirin enteric coated 325 milliGRAM(s) Oral two times a day  docusate sodium 100 milliGRAM(s) Oral three times a day  oxyCODONE  ER Tablet 10 milliGRAM(s) Oral every 12 hours  atorvastatin 20 milliGRAM(s) Oral at bedtime  insulin lispro (HumaLOG) corrective regimen sliding scale   SubCutaneous three times a day before meals  dextrose 5%. 1000 milliLiter(s) (50 mL/Hr) IV Continuous <Continuous>  dextrose 50% Injectable 12.5 Gram(s) IV Push once  dextrose 50% Injectable 25 Gram(s) IV Push once  dextrose 50% Injectable 25 Gram(s) IV Push once  lisinopril 40 milliGRAM(s) Oral daily    MEDICATIONS  (PRN):  acetaminophen   Tablet 975 milliGRAM(s) Oral every 8 hours PRN For Temp over 38.3 C (100.94 F)  oxyCODONE    IR 5 milliGRAM(s) Oral every 3 hours PRN Mild Pain  oxyCODONE    IR 10 milliGRAM(s) Oral every 3 hours PRN Moderate Pain  HYDROmorphone  Injectable 0.5 milliGRAM(s) IV Push every 4 hours PRN Breakthrough Pain  aluminum hydroxide/magnesium hydroxide/simethicone Suspension 30 milliLiter(s) Oral four times a day PRN Indigestion  ondansetron Injectable 4 milliGRAM(s) IV Push every 6 hours PRN Nausea and/or Vomiting  magnesium hydroxide Suspension 30 milliLiter(s) Oral daily PRN Constipation  senna 2 Tablet(s) Oral at bedtime PRN Constipation  dextrose Gel 1 Dose(s) Oral once PRN Blood Glucose LESS THAN 70 milliGRAM(s)/deciliter  glucagon  Injectable 1 milliGRAM(s) IntraMuscular once PRN Glucose LESS THAN 70 milligrams/deciliter      Physical exam: The right knee dressing is clean, dry and intact. No drainage or discharge. No erythema is noted. No blistering. No ecchymosis. The calf is supple nontender. Passive range of motion is acceptable to due postoperative pain. No calf tenderness. Sensation to light touch is grossly intact distally. Motor function distally is 5/5. Extensor hallucis longus and flexor hallucis longus are intact. No foot drop. 2+ dorsalis pedis pulse. Capillary refill is less than 2 seconds. No cyanosis.    Primary Orthopedic Assessment:  • s/p RIGHT total knee replacement      Secondary  Medical Assessment(s):   HEALTH ISSUES - PROBLEM Dx:  Obesity, Class II, BMI 35-39.9: Obesity, Class II, BMI 35-39.9  Sleep apnea, unspecified type: Sleep apnea, unspecified type      Plan:   • DVT prophylaxis as prescribed, including use of compression devices and ankle pumps  • Continue physical therapy  • Weightbearing as tolerated of the right lower extremity with assistance of a walker  • Incentive spirometry encouraged  • Pain control as clinically indicated  • Discharge planning – anticipated discharge is Home today  * dressing change performed

## 2017-08-27 NOTE — PROGRESS NOTE ADULT - ATTENDING COMMENTS
Right knee dressing dry, NVID RLE. Possible dc home tomorrow if cleared by medicine and PT
Medically stable to d/c home , once cleared by physical therapy .

## 2017-08-27 NOTE — PROGRESS NOTE ADULT - SUBJECTIVE AND OBJECTIVE BOX
Patient seen and examined . S/p  R TKA  , POD # 2    CC :R knee pain , well controlled       PAST MEDICAL & SURGICAL HISTORY:  Sleep apnea  Osteoarthritis  GERD (gastroesophageal reflux disease)  Hyperlipidemia  Diabetes mellitus  Hypertension  History of ovarian cystectomy  History of shoulder surgery: Left      MEDICATIONS  (STANDING):  aspirin enteric coated 325 milliGRAM(s) Oral two times a day  docusate sodium 100 milliGRAM(s) Oral three times a day  oxyCODONE  ER Tablet 10 milliGRAM(s) Oral every 12 hours  atorvastatin 20 milliGRAM(s) Oral at bedtime  insulin lispro (HumaLOG) corrective regimen sliding scale   SubCutaneous three times a day before meals  dextrose 5%. 1000 milliLiter(s) (50 mL/Hr) IV Continuous <Continuous>  dextrose 50% Injectable 12.5 Gram(s) IV Push once  dextrose 50% Injectable 25 Gram(s) IV Push once  dextrose 50% Injectable 25 Gram(s) IV Push once  lisinopril 40 milliGRAM(s) Oral daily    MEDICATIONS  (PRN):  acetaminophen   Tablet 975 milliGRAM(s) Oral every 8 hours PRN For Temp over 38.3 C (100.94 F)  oxyCODONE    IR 5 milliGRAM(s) Oral every 3 hours PRN Mild Pain  oxyCODONE    IR 10 milliGRAM(s) Oral every 3 hours PRN Moderate Pain  HYDROmorphone  Injectable 0.5 milliGRAM(s) IV Push every 4 hours PRN Breakthrough Pain  aluminum hydroxide/magnesium hydroxide/simethicone Suspension 30 milliLiter(s) Oral four times a day PRN Indigestion  ondansetron Injectable 4 milliGRAM(s) IV Push every 6 hours PRN Nausea and/or Vomiting  magnesium hydroxide Suspension 30 milliLiter(s) Oral daily PRN Constipation  senna 2 Tablet(s) Oral at bedtime PRN Constipation  dextrose Gel 1 Dose(s) Oral once PRN Blood Glucose LESS THAN 70 milliGRAM(s)/deciliter  glucagon  Injectable 1 milliGRAM(s) IntraMuscular once PRN Glucose LESS THAN 70 milligrams/deciliter      LABS:                          9.3    7.6   )-----------( 235      ( 27 Aug 2017 05:39 )             28.1     08-27    138  |  103  |  13.0  ----------------------------<  140<H>  4.5   |  25.0  |  1.04    Ca    8.7      27 Aug 2017 05:39          REVIEW OF SYSTEMS:    R knee pain , well controlled , all other systems reviewed and are negative     Vital Signs Last 24 Hrs  T(C): 37.6 (27 Aug 2017 08:13), Max: 37.9 (26 Aug 2017 15:40)  T(F): 99.6 (27 Aug 2017 08:13), Max: 100.3 (26 Aug 2017 15:40)  HR: 85 (27 Aug 2017 08:13) (71 - 85)  BP: 143/71 (27 Aug 2017 08:13) (108/62 - 143/71)  BP(mean): --  RR: 16 (27 Aug 2017 08:13) (16 - 18)  SpO2: 92% (27 Aug 2017 08:13) (92% - 94%)    PHYSICAL EXAM:    GENERAL: NAD, well-groomed, well-developed  HEAD:  Atraumatic, Normocephalic  EYES: EOMI, PERRLA, conjunctiva and sclera clear  NECK: Supple, No JVD, Normal thyroid  NERVOUS SYSTEM:  Alert & Oriented X3, no focal deficit  CHEST/LUNG: CTA b/l ,  no  rales, rhonchi, wheezing, or rubs  HEART: Regular rate and rhythm; No murmurs, rubs, or gallops  ABDOMEN: Soft, Nontender, Nondistended; Bowel sounds present  EXTREMITIES:  2+ Peripheral Pulses, No clubbing, cyanosis, or edema , R knee dressing + , clean and dry   LYMPH: No lymphadenopathy noted  SKIN: No rashes or lesions Patient seen and examined . S/p  R TKA  , POD # 2 . Pain well controlled with pain meds .    CC :R knee pain , well controlled , no other complaints .      PAST MEDICAL & SURGICAL HISTORY:  Sleep apnea  Osteoarthritis  GERD (gastroesophageal reflux disease)  Hyperlipidemia  Diabetes mellitus  Hypertension  History of ovarian cystectomy  History of shoulder surgery: Left      MEDICATIONS  (STANDING):  aspirin enteric coated 325 milliGRAM(s) Oral two times a day  docusate sodium 100 milliGRAM(s) Oral three times a day  oxyCODONE  ER Tablet 10 milliGRAM(s) Oral every 12 hours  atorvastatin 20 milliGRAM(s) Oral at bedtime  insulin lispro (HumaLOG) corrective regimen sliding scale   SubCutaneous three times a day before meals  dextrose 5%. 1000 milliLiter(s) (50 mL/Hr) IV Continuous <Continuous>  dextrose 50% Injectable 12.5 Gram(s) IV Push once  dextrose 50% Injectable 25 Gram(s) IV Push once  dextrose 50% Injectable 25 Gram(s) IV Push once  lisinopril 40 milliGRAM(s) Oral daily    MEDICATIONS  (PRN):  acetaminophen   Tablet 975 milliGRAM(s) Oral every 8 hours PRN For Temp over 38.3 C (100.94 F)  oxyCODONE    IR 5 milliGRAM(s) Oral every 3 hours PRN Mild Pain  oxyCODONE    IR 10 milliGRAM(s) Oral every 3 hours PRN Moderate Pain  HYDROmorphone  Injectable 0.5 milliGRAM(s) IV Push every 4 hours PRN Breakthrough Pain  aluminum hydroxide/magnesium hydroxide/simethicone Suspension 30 milliLiter(s) Oral four times a day PRN Indigestion  ondansetron Injectable 4 milliGRAM(s) IV Push every 6 hours PRN Nausea and/or Vomiting  magnesium hydroxide Suspension 30 milliLiter(s) Oral daily PRN Constipation  senna 2 Tablet(s) Oral at bedtime PRN Constipation  dextrose Gel 1 Dose(s) Oral once PRN Blood Glucose LESS THAN 70 milliGRAM(s)/deciliter  glucagon  Injectable 1 milliGRAM(s) IntraMuscular once PRN Glucose LESS THAN 70 milligrams/deciliter      LABS:                          9.3    7.6   )-----------( 235      ( 27 Aug 2017 05:39 )             28.1     08-27    138  |  103  |  13.0  ----------------------------<  140<H>  4.5   |  25.0  |  1.04    Ca    8.7      27 Aug 2017 05:39          REVIEW OF SYSTEMS:    R knee pain , well controlled , all other systems reviewed and are negative     Vital Signs Last 24 Hrs  T(C): 37.6 (27 Aug 2017 08:13), Max: 37.9 (26 Aug 2017 15:40)  T(F): 99.6 (27 Aug 2017 08:13), Max: 100.3 (26 Aug 2017 15:40)  HR: 85 (27 Aug 2017 08:13) (71 - 85)  BP: 143/71 (27 Aug 2017 08:13) (108/62 - 143/71)  BP(mean): --  RR: 16 (27 Aug 2017 08:13) (16 - 18)  SpO2: 92% (27 Aug 2017 08:13) (92% - 94%)    PHYSICAL EXAM:    GENERAL: NAD, well-groomed, well-developed  HEAD:  Atraumatic, Normocephalic  EYES: EOMI, PERRLA, conjunctiva and sclera clear  NECK: Supple, No JVD, Normal thyroid  NERVOUS SYSTEM:  Alert & Oriented X3, no focal deficit  CHEST/LUNG: CTA b/l ,  no  rales, rhonchi, wheezing, or rubs  HEART: Regular rate and rhythm; No murmurs, rubs, or gallops  ABDOMEN: Soft, Nontender, Nondistended; Bowel sounds present  EXTREMITIES:  2+ Peripheral Pulses, No clubbing, cyanosis, or edema , R knee dressing + , clean and dry   LYMPH: No lymphadenopathy noted  SKIN: No rashes or lesions

## 2017-08-27 NOTE — PROGRESS NOTE ADULT - ASSESSMENT
67y/o female with PMH HTN, HLD, DM2, GERD, osteoarthritis , s/p R TKA , POD # 2 .     Problem/Recommendation - 1:  Problem: Primary osteoarthritis of right knee. Recommendation: s/p right knee replacement 8/25/17  Antibiotics per ortho  Pain control  bowel regimen  PT eval   Incentive spirometry.    Problem/Recommendation - 2:  ·  Problem: Essential hypertension.  Recommendation: Continue Lisinopril and Norvasc with holding parameters.     Problem/Recommendation - 3:  ·  Problem: Sleep apnea, unspecified type.  Recommendation: Not on CPAP    overnight   head elevation 30degree.     Problem/Recommendation - 4:  ·  Problem: Diabetes mellitus.  Recommendation: Type 2 , well controlled . Hgb A1c 6.4   Hold Metformin , may restart on d/c home .  sliding scale coverage for now.     Problem/Recommendation - 5:  ·  Problem: Hyperlipidemia, unspecified hyperlipidemia type.  Recommendation: Continue statin.     Problem/Recommendation - 6:  Problem: Gastroesophageal reflux disease without esophagitis. Recommendation: Continue PPI.    Problem/Recommendation - 7:  Problem: ABLA - likely on anemia of chronic dz. Recommendation; asymptomatic . Patient advised to follow up with PMD in few wks to recheck CBC and have anemia w/u 67y/o female with PMH HTN, HLD, DM2, GERD, osteoarthritis , s/p R TKA , POD # 2 .     Problem/Recommendation - 1:  Problem: Primary osteoarthritis of right knee. Recommendation: s/p right knee replacement 8/25/17  Antibiotics per ortho  Pain control  bowel regimen  PT eval   Incentive spirometry.    Problem/Recommendation - 2:  ·  Problem: Essential hypertension.  Recommendation: Continue Lisinopril and Norvasc with holding parameters.     Problem/Recommendation - 3:  ·  Problem: Sleep apnea, unspecified type.  Recommendation: Not on CPAP    overnight   head elevation 30degree.     Problem/Recommendation - 4:  ·  Problem: Diabetes mellitus.  Recommendation: Type 2 , well controlled . Hgb A1c 6.4   Hold Metformin , may restart on d/c home .  sliding scale coverage for now.     Problem/Recommendation - 5:  ·  Problem: Hyperlipidemia, unspecified hyperlipidemia type.  Recommendation: Continue statin.     Problem/Recommendation - 6:  Problem: Gastroesophageal reflux disease without esophagitis. Recommendation: Continue PPI.    Problem/Recommendation - 7:  Problem: ABLA - likely on anemia of chronic dz. Recommendation; asymptomatic . Patient advised to follow up with PMD in few wks to recheck CBC and have anemia w/u .     Problem / Plan - 8; Prerenal azotemia - patient advised to increase PO fluid intake for few days .

## 2017-08-31 ENCOUNTER — APPOINTMENT (OUTPATIENT)
Dept: ORTHOPEDIC SURGERY | Facility: CLINIC | Age: 67
End: 2017-08-31
Payer: MEDICARE

## 2017-08-31 ENCOUNTER — OTHER (OUTPATIENT)
Age: 67
End: 2017-08-31

## 2017-08-31 VITALS
BODY MASS INDEX: 38.64 KG/M2 | WEIGHT: 210 LBS | HEART RATE: 72 BPM | DIASTOLIC BLOOD PRESSURE: 76 MMHG | SYSTOLIC BLOOD PRESSURE: 170 MMHG | HEIGHT: 62 IN

## 2017-08-31 LAB — SURGICAL PATHOLOGY FINAL REPORT - CH: SIGNIFICANT CHANGE UP

## 2017-08-31 PROCEDURE — 20610 DRAIN/INJ JOINT/BURSA W/O US: CPT | Mod: 79,LT

## 2017-09-05 ENCOUNTER — RX RENEWAL (OUTPATIENT)
Age: 67
End: 2017-09-05

## 2017-09-07 ENCOUNTER — APPOINTMENT (OUTPATIENT)
Dept: ORTHOPEDIC SURGERY | Facility: CLINIC | Age: 67
End: 2017-09-07
Payer: MEDICARE

## 2017-09-07 VITALS
WEIGHT: 210 LBS | HEIGHT: 62 IN | DIASTOLIC BLOOD PRESSURE: 80 MMHG | HEART RATE: 82 BPM | BODY MASS INDEX: 38.64 KG/M2 | SYSTOLIC BLOOD PRESSURE: 149 MMHG

## 2017-09-07 DIAGNOSIS — M17.12 UNILATERAL PRIMARY OSTEOARTHRITIS, LEFT KNEE: ICD-10-CM

## 2017-09-07 PROCEDURE — 20610 DRAIN/INJ JOINT/BURSA W/O US: CPT | Mod: 79,LT

## 2017-09-08 ENCOUNTER — APPOINTMENT (OUTPATIENT)
Dept: FAMILY MEDICINE | Facility: CLINIC | Age: 67
End: 2017-09-08
Payer: MEDICARE

## 2017-09-08 VITALS
HEART RATE: 81 BPM | OXYGEN SATURATION: 100 % | DIASTOLIC BLOOD PRESSURE: 84 MMHG | BODY MASS INDEX: 37.36 KG/M2 | SYSTOLIC BLOOD PRESSURE: 164 MMHG | WEIGHT: 203 LBS | HEIGHT: 62 IN | TEMPERATURE: 98.91 F

## 2017-09-08 DIAGNOSIS — Z76.89 PERSONS ENCOUNTERING HEALTH SERVICES IN OTHER SPECIFIED CIRCUMSTANCES: ICD-10-CM

## 2017-09-08 LAB
GLUCOSE BLDC GLUCOMTR-MCNC: 113
HBA1C MFR BLD HPLC: 6.2

## 2017-09-08 PROCEDURE — 83036 HEMOGLOBIN GLYCOSYLATED A1C: CPT | Mod: QW

## 2017-09-08 PROCEDURE — 82962 GLUCOSE BLOOD TEST: CPT

## 2017-09-08 PROCEDURE — 99204 OFFICE O/P NEW MOD 45 MIN: CPT | Mod: 25

## 2017-09-08 RX ORDER — ROSUVASTATIN CALCIUM 5 MG/1
5 TABLET, FILM COATED ORAL
Qty: 30 | Refills: 0 | Status: DISCONTINUED | COMMUNITY
Start: 2017-05-25 | End: 2017-09-08

## 2017-09-08 RX ORDER — HYDROCHLOROTHIAZIDE 25 MG/1
25 TABLET ORAL
Qty: 30 | Refills: 0 | Status: DISCONTINUED | COMMUNITY
Start: 2017-02-23 | End: 2017-09-08

## 2017-09-08 RX ORDER — TRAMADOL HYDROCHLORIDE 50 MG/1
50 TABLET, COATED ORAL
Qty: 120 | Refills: 0 | Status: DISCONTINUED | COMMUNITY
Start: 2017-06-20 | End: 2017-09-08

## 2017-09-11 ENCOUNTER — APPOINTMENT (OUTPATIENT)
Dept: ORTHOPEDIC SURGERY | Facility: CLINIC | Age: 67
End: 2017-09-11
Payer: MEDICARE

## 2017-09-11 VITALS
BODY MASS INDEX: 37.36 KG/M2 | WEIGHT: 203 LBS | HEIGHT: 62 IN | HEART RATE: 97 BPM | SYSTOLIC BLOOD PRESSURE: 145 MMHG | DIASTOLIC BLOOD PRESSURE: 90 MMHG

## 2017-09-11 PROCEDURE — 73562 X-RAY EXAM OF KNEE 3: CPT | Mod: RT

## 2017-09-11 PROCEDURE — 99024 POSTOP FOLLOW-UP VISIT: CPT

## 2017-09-11 RX ORDER — ASPIRIN 325 MG/1
TABLET, FILM COATED ORAL
Refills: 0 | Status: DISCONTINUED | COMMUNITY
End: 2017-09-11

## 2017-09-11 RX ORDER — HYALURONATE SODIUM 10 MG/ML
25 SYRINGE (ML) INTRAARTICULAR
Qty: 5 | Refills: 0 | Status: DISCONTINUED | OUTPATIENT
Start: 2017-07-06 | End: 2017-09-11

## 2017-09-21 ENCOUNTER — RX RENEWAL (OUTPATIENT)
Age: 67
End: 2017-09-21

## 2017-09-28 ENCOUNTER — OUTPATIENT (OUTPATIENT)
Dept: OUTPATIENT SERVICES | Facility: HOSPITAL | Age: 67
LOS: 1 days | End: 2017-09-28
Payer: MEDICARE

## 2017-09-28 DIAGNOSIS — Z51.89 ENCOUNTER FOR OTHER SPECIFIED AFTERCARE: ICD-10-CM

## 2017-09-28 DIAGNOSIS — Z98.890 OTHER SPECIFIED POSTPROCEDURAL STATES: Chronic | ICD-10-CM

## 2017-09-28 DIAGNOSIS — M17.11 UNILATERAL PRIMARY OSTEOARTHRITIS, RIGHT KNEE: ICD-10-CM

## 2017-10-02 ENCOUNTER — APPOINTMENT (OUTPATIENT)
Dept: FAMILY MEDICINE | Facility: CLINIC | Age: 67
End: 2017-10-02
Payer: MEDICARE

## 2017-10-02 VITALS
SYSTOLIC BLOOD PRESSURE: 149 MMHG | BODY MASS INDEX: 37.17 KG/M2 | HEIGHT: 62 IN | TEMPERATURE: 98.3 F | HEART RATE: 82 BPM | DIASTOLIC BLOOD PRESSURE: 83 MMHG | WEIGHT: 202 LBS | OXYGEN SATURATION: 98 %

## 2017-10-02 DIAGNOSIS — Z00.00 ENCOUNTER FOR GENERAL ADULT MEDICAL EXAMINATION W/OUT ABNORMAL FINDINGS: ICD-10-CM

## 2017-10-02 DIAGNOSIS — Z84.1 FAMILY HISTORY OF DISORDERS OF KIDNEY AND URETER: ICD-10-CM

## 2017-10-02 PROCEDURE — G0438: CPT

## 2017-10-04 ENCOUNTER — LABORATORY RESULT (OUTPATIENT)
Age: 67
End: 2017-10-04

## 2017-10-04 ENCOUNTER — OTHER (OUTPATIENT)
Age: 67
End: 2017-10-04

## 2017-10-06 ENCOUNTER — RX RENEWAL (OUTPATIENT)
Age: 67
End: 2017-10-06

## 2017-10-06 LAB
25(OH)D3 SERPL-MCNC: 28 NG/ML
ALBUMIN SERPL ELPH-MCNC: 4.1 G/DL
ALP BLD-CCNC: 60 U/L
ALT SERPL-CCNC: 11 U/L
ANION GAP SERPL CALC-SCNC: 14 MMOL/L
AST SERPL-CCNC: 20 U/L
BASOPHILS # BLD AUTO: 0 K/UL
BASOPHILS NFR BLD AUTO: 0 %
BILIRUB SERPL-MCNC: 0.6 MG/DL
BUN SERPL-MCNC: 8 MG/DL
CALCIUM SERPL-MCNC: 10.1 MG/DL
CHLORIDE SERPL-SCNC: 99 MMOL/L
CHOLEST SERPL-MCNC: 186 MG/DL
CHOLEST/HDLC SERPL: 2.8 RATIO
CO2 SERPL-SCNC: 26 MMOL/L
CREAT SERPL-MCNC: 0.94 MG/DL
EOSINOPHIL # BLD AUTO: 0.19 K/UL
EOSINOPHIL NFR BLD AUTO: 3.1 %
GLUCOSE SERPL-MCNC: 111 MG/DL
HCT VFR BLD CALC: 31.7 %
HDLC SERPL-MCNC: 67 MG/DL
HGB BLD-MCNC: 10.1 G/DL
IMM GRANULOCYTES NFR BLD AUTO: 0.2 %
LDLC SERPL CALC-MCNC: 101 MG/DL
LYMPHOCYTES # BLD AUTO: 2.06 K/UL
LYMPHOCYTES NFR BLD AUTO: 33.2 %
MAN DIFF?: NORMAL
MCHC RBC-ENTMCNC: 26.9 PG
MCHC RBC-ENTMCNC: 31.9 GM/DL
MCV RBC AUTO: 84.5 FL
MONOCYTES # BLD AUTO: 0.34 K/UL
MONOCYTES NFR BLD AUTO: 5.5 %
NEUTROPHILS # BLD AUTO: 3.61 K/UL
NEUTROPHILS NFR BLD AUTO: 58 %
PLATELET # BLD AUTO: 438 K/UL
POTASSIUM SERPL-SCNC: 4 MMOL/L
PROT SERPL-MCNC: 7.9 G/DL
RBC # BLD: 3.75 M/UL
RBC # FLD: 15 %
SODIUM SERPL-SCNC: 139 MMOL/L
TRIGL SERPL-MCNC: 92 MG/DL
TSH SERPL-ACNC: 0.64 UIU/ML
WBC # FLD AUTO: 6.21 K/UL

## 2017-10-11 ENCOUNTER — APPOINTMENT (OUTPATIENT)
Dept: ORTHOPEDIC SURGERY | Facility: CLINIC | Age: 67
End: 2017-10-11
Payer: MEDICARE

## 2017-10-11 VITALS
SYSTOLIC BLOOD PRESSURE: 128 MMHG | DIASTOLIC BLOOD PRESSURE: 84 MMHG | WEIGHT: 202 LBS | HEIGHT: 62 IN | BODY MASS INDEX: 37.17 KG/M2 | HEART RATE: 86 BPM

## 2017-10-11 PROCEDURE — 99024 POSTOP FOLLOW-UP VISIT: CPT

## 2017-10-11 PROCEDURE — 73562 X-RAY EXAM OF KNEE 3: CPT | Mod: RT

## 2017-11-01 ENCOUNTER — RX RENEWAL (OUTPATIENT)
Age: 67
End: 2017-11-01

## 2017-11-08 ENCOUNTER — OTHER (OUTPATIENT)
Age: 67
End: 2017-11-08

## 2017-11-14 PROCEDURE — G8978: CPT | Mod: CK

## 2017-11-14 PROCEDURE — 97110 THERAPEUTIC EXERCISES: CPT

## 2017-11-14 PROCEDURE — 97162 PT EVAL MOD COMPLEX 30 MIN: CPT

## 2017-11-14 PROCEDURE — 97140 MANUAL THERAPY 1/> REGIONS: CPT

## 2017-11-14 PROCEDURE — G8979: CPT | Mod: CJ

## 2017-11-14 PROCEDURE — 97010 HOT OR COLD PACKS THERAPY: CPT

## 2017-11-16 ENCOUNTER — APPOINTMENT (OUTPATIENT)
Dept: ORTHOPEDIC SURGERY | Facility: CLINIC | Age: 67
End: 2017-11-16
Payer: MEDICARE

## 2017-11-16 VITALS
HEIGHT: 62 IN | SYSTOLIC BLOOD PRESSURE: 150 MMHG | OXYGEN SATURATION: 79 % | DIASTOLIC BLOOD PRESSURE: 79 MMHG | WEIGHT: 202 LBS | BODY MASS INDEX: 37.17 KG/M2

## 2017-11-16 PROCEDURE — 99024 POSTOP FOLLOW-UP VISIT: CPT

## 2017-11-16 PROCEDURE — 73562 X-RAY EXAM OF KNEE 3: CPT | Mod: RT

## 2017-11-17 ENCOUNTER — OUTPATIENT (OUTPATIENT)
Dept: OUTPATIENT SERVICES | Facility: HOSPITAL | Age: 67
LOS: 1 days | End: 2017-11-17
Payer: MEDICARE

## 2017-11-17 VITALS
SYSTOLIC BLOOD PRESSURE: 150 MMHG | TEMPERATURE: 98 F | HEART RATE: 80 BPM | WEIGHT: 196.21 LBS | DIASTOLIC BLOOD PRESSURE: 80 MMHG | RESPIRATION RATE: 16 BRPM | HEIGHT: 62 IN

## 2017-11-17 DIAGNOSIS — M25.561 PAIN IN RIGHT KNEE: ICD-10-CM

## 2017-11-17 DIAGNOSIS — Z98.890 OTHER SPECIFIED POSTPROCEDURAL STATES: Chronic | ICD-10-CM

## 2017-11-17 DIAGNOSIS — E11.9 TYPE 2 DIABETES MELLITUS WITHOUT COMPLICATIONS: ICD-10-CM

## 2017-11-17 DIAGNOSIS — Z96.651 PRESENCE OF RIGHT ARTIFICIAL KNEE JOINT: Chronic | ICD-10-CM

## 2017-11-17 DIAGNOSIS — Z01.818 ENCOUNTER FOR OTHER PREPROCEDURAL EXAMINATION: ICD-10-CM

## 2017-11-17 LAB
ALBUMIN SERPL ELPH-MCNC: 4.2 G/DL — SIGNIFICANT CHANGE UP (ref 3.3–5.2)
ALP SERPL-CCNC: 66 U/L — SIGNIFICANT CHANGE UP (ref 40–120)
ALT FLD-CCNC: 11 U/L — SIGNIFICANT CHANGE UP
ANION GAP SERPL CALC-SCNC: 13 MMOL/L — SIGNIFICANT CHANGE UP (ref 5–17)
APTT BLD: 29.6 SEC — SIGNIFICANT CHANGE UP (ref 27.5–37.4)
AST SERPL-CCNC: 17 U/L — SIGNIFICANT CHANGE UP
BILIRUB SERPL-MCNC: 0.5 MG/DL — SIGNIFICANT CHANGE UP (ref 0.4–2)
BUN SERPL-MCNC: 15 MG/DL — SIGNIFICANT CHANGE UP (ref 8–20)
CALCIUM SERPL-MCNC: 9.6 MG/DL — SIGNIFICANT CHANGE UP (ref 8.6–10.2)
CHLORIDE SERPL-SCNC: 102 MMOL/L — SIGNIFICANT CHANGE UP (ref 98–107)
CO2 SERPL-SCNC: 28 MMOL/L — SIGNIFICANT CHANGE UP (ref 22–29)
CREAT SERPL-MCNC: 0.8 MG/DL — SIGNIFICANT CHANGE UP (ref 0.5–1.3)
GLUCOSE SERPL-MCNC: 93 MG/DL — SIGNIFICANT CHANGE UP (ref 70–115)
HBA1C BLD-MCNC: 5.7 % — HIGH (ref 4–5.6)
HCT VFR BLD CALC: 33.3 % — LOW (ref 37–47)
HGB BLD-MCNC: 10.5 G/DL — LOW (ref 12–16)
INR BLD: 1.06 RATIO — SIGNIFICANT CHANGE UP (ref 0.88–1.16)
MCHC RBC-ENTMCNC: 26.7 PG — LOW (ref 27–31)
MCHC RBC-ENTMCNC: 31.5 G/DL — LOW (ref 32–36)
MCV RBC AUTO: 84.7 FL — SIGNIFICANT CHANGE UP (ref 81–99)
PLATELET # BLD AUTO: 359 K/UL — SIGNIFICANT CHANGE UP (ref 150–400)
POTASSIUM SERPL-MCNC: 4 MMOL/L — SIGNIFICANT CHANGE UP (ref 3.5–5.3)
POTASSIUM SERPL-SCNC: 4 MMOL/L — SIGNIFICANT CHANGE UP (ref 3.5–5.3)
PROT SERPL-MCNC: 7.6 G/DL — SIGNIFICANT CHANGE UP (ref 6.6–8.7)
PROTHROM AB SERPL-ACNC: 11.7 SEC — SIGNIFICANT CHANGE UP (ref 9.8–12.7)
RBC # BLD: 3.93 M/UL — LOW (ref 4.4–5.2)
RBC # FLD: 15.2 % — SIGNIFICANT CHANGE UP (ref 11–15.6)
SODIUM SERPL-SCNC: 143 MMOL/L — SIGNIFICANT CHANGE UP (ref 135–145)
WBC # BLD: 6.1 K/UL — SIGNIFICANT CHANGE UP (ref 4.8–10.8)
WBC # FLD AUTO: 6.1 K/UL — SIGNIFICANT CHANGE UP (ref 4.8–10.8)

## 2017-11-17 PROCEDURE — 85730 THROMBOPLASTIN TIME PARTIAL: CPT

## 2017-11-17 PROCEDURE — 85027 COMPLETE CBC AUTOMATED: CPT

## 2017-11-17 PROCEDURE — G0463: CPT

## 2017-11-17 PROCEDURE — 80053 COMPREHEN METABOLIC PANEL: CPT

## 2017-11-17 PROCEDURE — 93005 ELECTROCARDIOGRAM TRACING: CPT

## 2017-11-17 PROCEDURE — 85610 PROTHROMBIN TIME: CPT

## 2017-11-17 PROCEDURE — 93010 ELECTROCARDIOGRAM REPORT: CPT

## 2017-11-17 PROCEDURE — 36415 COLL VENOUS BLD VENIPUNCTURE: CPT

## 2017-11-17 PROCEDURE — 83036 HEMOGLOBIN GLYCOSYLATED A1C: CPT

## 2017-11-17 RX ORDER — METFORMIN HYDROCHLORIDE 850 MG/1
1 TABLET ORAL
Qty: 0 | Refills: 0 | COMMUNITY

## 2017-11-17 RX ORDER — OMEPRAZOLE 10 MG/1
1 CAPSULE, DELAYED RELEASE ORAL
Qty: 0 | Refills: 0 | COMMUNITY

## 2017-11-17 RX ORDER — SODIUM CHLORIDE 9 MG/ML
3 INJECTION INTRAMUSCULAR; INTRAVENOUS; SUBCUTANEOUS ONCE
Qty: 0 | Refills: 0 | Status: DISCONTINUED | OUTPATIENT
Start: 2017-11-22 | End: 2017-11-22

## 2017-11-17 NOTE — H&P PST ADULT - NEGATIVE ENMT SYMPTOMS
no hearing difficulty/no throat pain/no dysphagia/no vertigo/no sinus symptoms/no nasal congestion/no nose bleeds/no recurrent cold sores/no ear pain/no nasal obstruction/no post-nasal discharge/no abnormal taste sensation/no gum bleeding/no dry mouth/no tinnitus/no nasal discharge

## 2017-11-17 NOTE — H&P PST ADULT - NEGATIVE PSYCHIATRIC SYMPTOMS
no suicidal ideation/no memory loss/no mood swings/no agitation/no auditory hallucinations/no hyperactivity/no anxiety/no visual hallucinations/no insomnia/no paranoia/no depression

## 2017-11-17 NOTE — H&P PST ADULT - MS GEN HX ROS MEA POS PC
Arthralgia    Arthralgia is the term for pain in or around the joint. It is a symptom, not a disease. This pain may involve one or more joints. In some cases, the pain moves from joint to joint.  There are many causes for joint pain. These include:  · Injury  · Osteoarthritis (wearing out of the joint surface)  · Gout (inflammation of the joint due to crystals in the joint fluid)  · Infection inside the joint    · Bursitis (inflammation of the fluid-filled sacs around the joint)  · Autoimmune disorders such as rheumatoid arthritis or lupus  · Tendonitis (inflamation of chords that attach muscle to bone)  Home care  · Rest the involved joint(s) until your symptoms improve.   · You may be prescribed pain medication. If none is prescribed, you may use acetaminophen or ibuprofen to control pain and inflammation.  Follow up  Follow up with your healthcare provider or our staff as advised.  When to seek medical care  Contact your healthcare provider right away if any of the following occurs:  · Pain, swelling, or redness of joint increases  · Pain worsens or recurs after a period of improvement  · Pain moves to other joints  · You cannot bear weight on the affected joint   · You cannot move the affected joint  · Joint appears deformed  · New rash appears  · Fever of 101ºF (38.8ºC) or higher, or as directed by your healthcare provider  © 1078-7423 [employer]. [address] [place] [address], [place] [address]. All rights reserved. This information is not intended as a substitute for professional medical care. Always follow your healthcare professional's instructions.         arthritis/leg pain R/stiffness

## 2017-11-17 NOTE — H&P PST ADULT - NEGATIVE SKIN SYMPTOMS
no rash/no itching/no dryness/no brittle nails/no tumor/no hair loss/no change in size/color of mole/no pitted nails

## 2017-11-17 NOTE — H&P PST ADULT - GASTROINTESTINAL DETAILS
no rigidity/no organomegaly/nontender/no guarding/no distention/normal/soft/no rebound tenderness/no masses palpable/no bruit/bowel sounds normal

## 2017-11-17 NOTE — H&P PST ADULT - NEGATIVE NEUROLOGICAL SYMPTOMS
no loss of sensation/no headache/no confusion/no paresthesias/no focal seizures/no syncope/no vertigo/no weakness/no generalized seizures/no hemiparesis/no facial palsy/no tremors/no transient paralysis/no loss of consciousness

## 2017-11-17 NOTE — H&P PST ADULT - NEGATIVE GENERAL GENITOURINARY SYMPTOMS
no renal colic/no incontinence/no dysuria/no gas in urine/no nocturia/no flank pain L/normal urinary frequency/no urine discoloration/no hematuria/no flank pain R/no bladder infections/no urinary hesitancy

## 2017-11-17 NOTE — H&P PST ADULT - NEGATIVE FEMALE-SPECIFIC SYMPTOMS
no irregular menses/no vaginal discharge/no spotting/no menorrhagia/no abnormal vaginal bleeding/no pelvic pain/no dysmenorrhea/no amenorrhea/not applicable

## 2017-11-17 NOTE — H&P PST ADULT - NEUROLOGICAL DETAILS
responds to pain/alert and oriented x 3/deep reflexes intact/normal strength/cranial nerves intact/no spontaneous movement/sensation intact/superficial reflexes intact/responds to verbal commands

## 2017-11-17 NOTE — H&P PST ADULT - NEGATIVE CARDIOVASCULAR SYMPTOMS
no chest pain/no dyspnea on exertion/no palpitations/no orthopnea/no paroxysmal nocturnal dyspnea/no claudication/no peripheral edema

## 2017-11-17 NOTE — H&P PST ADULT - HISTORY OF PRESENT ILLNESS
66 y/o female s/p right knee replacement on 8/25/17 patient reports right knee stiffness and limited ROM patient now presents for right knee manipulation under anesthesia

## 2017-11-17 NOTE — H&P PST ADULT - NEGATIVE GASTROINTESTINAL SYMPTOMS
no melena/no jaundice/no hematochezia/no steatorrhea/no hiccoughs/no abdominal pain/no nausea/no vomiting/no diarrhea/no flatulence

## 2017-11-17 NOTE — H&P PST ADULT - PSH
H/O total knee replacement, right  8/2017  History of ovarian cystectomy    History of shoulder surgery  Left

## 2017-11-17 NOTE — H&P PST ADULT - NEGATIVE OPHTHALMOLOGIC SYMPTOMS
no discharge R/no pain L/no pain R/no irritation R/no loss of vision L/no scleral injection L/no lacrimation R/no discharge L/no blurred vision L/no blurred vision R/no irritation L/no diplopia/no photophobia/no lacrimation L/no loss of vision R/no scleral injection R

## 2017-11-17 NOTE — H&P PST ADULT - NEGATIVE GENERAL SYMPTOMS
no fatigue/no chills/no anorexia/no weight loss/no polyphagia/no polydipsia/no fever/no sweating/no weight gain/no polyuria/no malaise

## 2017-11-17 NOTE — H&P PST ADULT - RS GEN PE MLT RESP DETAILS PC
clear to auscultation bilaterally/no chest wall tenderness/no intercostal retractions/no wheezes/no rales/no rhonchi/respirations non-labored/good air movement/normal/breath sounds equal/no subcutaneous emphysema/airway patent

## 2017-11-17 NOTE — H&P PST ADULT - PMH
Burning reflux    Diabetes mellitus    GERD (gastroesophageal reflux disease)    Heart palpitations    Hyperlipidemia    Hypertension    Osteoarthritis    Sleep apnea

## 2017-11-17 NOTE — H&P PST ADULT - MUSCULOSKELETAL
details… detailed exam no joint swelling/normal strength/no calf tenderness/no joint warmth/normal/ROM intact/no joint erythema

## 2017-11-20 ENCOUNTER — APPOINTMENT (OUTPATIENT)
Dept: FAMILY MEDICINE | Facility: CLINIC | Age: 67
End: 2017-11-20
Payer: MEDICARE

## 2017-11-20 VITALS
WEIGHT: 197 LBS | SYSTOLIC BLOOD PRESSURE: 158 MMHG | OXYGEN SATURATION: 100 % | HEIGHT: 62 IN | DIASTOLIC BLOOD PRESSURE: 81 MMHG | HEART RATE: 76 BPM | TEMPERATURE: 98.4 F | BODY MASS INDEX: 36.25 KG/M2

## 2017-11-20 PROCEDURE — 99214 OFFICE O/P EST MOD 30 MIN: CPT

## 2017-11-20 RX ORDER — AMLODIPINE BESYLATE 5 MG/1
5 TABLET ORAL
Qty: 30 | Refills: 0 | Status: DISCONTINUED | COMMUNITY
Start: 2017-05-25 | End: 2017-11-20

## 2017-11-20 RX ORDER — ROSUVASTATIN CALCIUM 20 MG/1
20 TABLET, FILM COATED ORAL
Qty: 90 | Refills: 0 | Status: DISCONTINUED | COMMUNITY
Start: 2017-06-30 | End: 2017-11-20

## 2017-11-21 ENCOUNTER — FORM ENCOUNTER (OUTPATIENT)
Age: 67
End: 2017-11-21

## 2017-11-22 ENCOUNTER — OUTPATIENT (OUTPATIENT)
Dept: OUTPATIENT SERVICES | Facility: HOSPITAL | Age: 67
LOS: 1 days | End: 2017-11-22
Payer: MEDICARE

## 2017-11-22 ENCOUNTER — RX RENEWAL (OUTPATIENT)
Age: 67
End: 2017-11-22

## 2017-11-22 ENCOUNTER — APPOINTMENT (OUTPATIENT)
Dept: ORTHOPEDIC SURGERY | Facility: HOSPITAL | Age: 67
End: 2017-11-22

## 2017-11-22 VITALS
HEIGHT: 62 IN | HEART RATE: 85 BPM | WEIGHT: 196.21 LBS | RESPIRATION RATE: 16 BRPM | SYSTOLIC BLOOD PRESSURE: 148 MMHG | DIASTOLIC BLOOD PRESSURE: 78 MMHG | OXYGEN SATURATION: 100 % | TEMPERATURE: 98 F

## 2017-11-22 VITALS
DIASTOLIC BLOOD PRESSURE: 68 MMHG | RESPIRATION RATE: 15 BRPM | SYSTOLIC BLOOD PRESSURE: 148 MMHG | OXYGEN SATURATION: 96 % | HEART RATE: 77 BPM | TEMPERATURE: 97 F

## 2017-11-22 DIAGNOSIS — M25.561 PAIN IN RIGHT KNEE: ICD-10-CM

## 2017-11-22 DIAGNOSIS — Z98.890 OTHER SPECIFIED POSTPROCEDURAL STATES: Chronic | ICD-10-CM

## 2017-11-22 DIAGNOSIS — Z96.651 PRESENCE OF RIGHT ARTIFICIAL KNEE JOINT: Chronic | ICD-10-CM

## 2017-11-22 LAB — GLUCOSE BLDC GLUCOMTR-MCNC: 103 MG/DL — HIGH (ref 70–99)

## 2017-11-22 PROCEDURE — 27570 FIXATION OF KNEE JOINT: CPT | Mod: RT

## 2017-11-22 PROCEDURE — 82962 GLUCOSE BLOOD TEST: CPT

## 2017-11-22 PROCEDURE — 73562 X-RAY EXAM OF KNEE 3: CPT

## 2017-11-22 PROCEDURE — 73562 X-RAY EXAM OF KNEE 3: CPT | Mod: 26,RT

## 2017-11-22 PROCEDURE — 27570 FIXATION OF KNEE JOINT: CPT | Mod: 58,RT

## 2017-11-22 RX ORDER — ONDANSETRON 8 MG/1
4 TABLET, FILM COATED ORAL ONCE
Qty: 0 | Refills: 0 | Status: DISCONTINUED | OUTPATIENT
Start: 2017-11-22 | End: 2017-11-22

## 2017-11-22 RX ORDER — OXYCODONE HYDROCHLORIDE 5 MG/1
1 TABLET ORAL
Qty: 30 | Refills: 0 | OUTPATIENT
Start: 2017-11-22

## 2017-11-22 RX ORDER — FENTANYL CITRATE 50 UG/ML
25 INJECTION INTRAVENOUS
Qty: 0 | Refills: 0 | Status: DISCONTINUED | OUTPATIENT
Start: 2017-11-22 | End: 2017-11-22

## 2017-11-22 RX ORDER — FENTANYL CITRATE 50 UG/ML
50 INJECTION INTRAVENOUS ONCE
Qty: 0 | Refills: 0 | Status: DISCONTINUED | OUTPATIENT
Start: 2017-11-22 | End: 2017-11-22

## 2017-11-22 RX ORDER — METFORMIN HYDROCHLORIDE 850 MG/1
1 TABLET ORAL
Qty: 0 | Refills: 0 | COMMUNITY

## 2017-11-22 RX ORDER — LISINOPRIL 2.5 MG/1
1 TABLET ORAL
Qty: 0 | Refills: 0 | COMMUNITY

## 2017-11-22 RX ORDER — ACETAMINOPHEN 500 MG
1000 TABLET ORAL ONCE
Qty: 0 | Refills: 0 | Status: COMPLETED | OUTPATIENT
Start: 2017-11-22 | End: 2017-11-22

## 2017-11-22 RX ORDER — CHOLECALCIFEROL (VITAMIN D3) 125 MCG
1 CAPSULE ORAL
Qty: 0 | Refills: 0 | COMMUNITY

## 2017-11-22 RX ORDER — ASPIRIN/CALCIUM CARB/MAGNESIUM 324 MG
1 TABLET ORAL
Qty: 0 | Refills: 0 | COMMUNITY

## 2017-11-22 RX ORDER — OXYCODONE AND ACETAMINOPHEN 5; 325 MG/1; MG/1
1 TABLET ORAL EVERY 4 HOURS
Qty: 0 | Refills: 0 | Status: DISCONTINUED | OUTPATIENT
Start: 2017-11-22 | End: 2017-11-22

## 2017-11-22 RX ORDER — HYDROMORPHONE HYDROCHLORIDE 2 MG/ML
0.5 INJECTION INTRAMUSCULAR; INTRAVENOUS; SUBCUTANEOUS
Qty: 0 | Refills: 0 | Status: DISCONTINUED | OUTPATIENT
Start: 2017-11-22 | End: 2017-11-22

## 2017-11-22 RX ORDER — DOCUSATE SODIUM 100 MG
1 CAPSULE ORAL
Qty: 14 | Refills: 0 | OUTPATIENT
Start: 2017-11-22

## 2017-11-22 RX ORDER — KETOROLAC TROMETHAMINE 30 MG/ML
30 SYRINGE (ML) INJECTION ONCE
Qty: 0 | Refills: 0 | Status: DISCONTINUED | OUTPATIENT
Start: 2017-11-22 | End: 2017-11-22

## 2017-11-22 RX ORDER — ROSUVASTATIN CALCIUM 5 MG/1
1 TABLET ORAL
Qty: 0 | Refills: 0 | COMMUNITY

## 2017-11-22 RX ORDER — AMLODIPINE BESYLATE 2.5 MG/1
1 TABLET ORAL
Qty: 0 | Refills: 0 | COMMUNITY

## 2017-11-22 RX ORDER — OXYCODONE AND ACETAMINOPHEN 5; 325 MG/1; MG/1
2 TABLET ORAL EVERY 4 HOURS
Qty: 0 | Refills: 0 | Status: DISCONTINUED | OUTPATIENT
Start: 2017-11-22 | End: 2017-11-22

## 2017-11-22 RX ORDER — SODIUM CHLORIDE 9 MG/ML
1000 INJECTION, SOLUTION INTRAVENOUS
Qty: 0 | Refills: 0 | Status: DISCONTINUED | OUTPATIENT
Start: 2017-11-22 | End: 2017-11-22

## 2017-11-22 RX ORDER — OMEPRAZOLE 10 MG/1
1 CAPSULE, DELAYED RELEASE ORAL
Qty: 0 | Refills: 0 | COMMUNITY

## 2017-11-22 RX ADMIN — Medication 400 MILLIGRAM(S): at 09:14

## 2017-11-22 RX ADMIN — OXYCODONE AND ACETAMINOPHEN 2 TABLET(S): 5; 325 TABLET ORAL at 10:11

## 2017-11-22 RX ADMIN — Medication 1000 MILLIGRAM(S): at 09:14

## 2017-11-22 RX ADMIN — Medication 30 MILLIGRAM(S): at 09:12

## 2017-11-22 RX ADMIN — FENTANYL CITRATE 25 MICROGRAM(S): 50 INJECTION INTRAVENOUS at 08:36

## 2017-11-22 RX ADMIN — FENTANYL CITRATE 25 MICROGRAM(S): 50 INJECTION INTRAVENOUS at 08:43

## 2017-11-22 RX ADMIN — FENTANYL CITRATE 50 MICROGRAM(S): 50 INJECTION INTRAVENOUS at 08:55

## 2017-11-22 RX ADMIN — FENTANYL CITRATE 25 MICROGRAM(S): 50 INJECTION INTRAVENOUS at 08:35

## 2017-11-22 RX ADMIN — FENTANYL CITRATE 25 MICROGRAM(S): 50 INJECTION INTRAVENOUS at 08:45

## 2017-11-22 RX ADMIN — FENTANYL CITRATE 25 MICROGRAM(S): 50 INJECTION INTRAVENOUS at 08:50

## 2017-11-22 RX ADMIN — FENTANYL CITRATE 25 MICROGRAM(S): 50 INJECTION INTRAVENOUS at 08:54

## 2017-11-22 RX ADMIN — HYDROMORPHONE HYDROCHLORIDE 0.5 MILLIGRAM(S): 2 INJECTION INTRAMUSCULAR; INTRAVENOUS; SUBCUTANEOUS at 09:14

## 2017-11-22 RX ADMIN — FENTANYL CITRATE 50 MICROGRAM(S): 50 INJECTION INTRAVENOUS at 08:57

## 2017-11-22 RX ADMIN — FENTANYL CITRATE 25 MICROGRAM(S): 50 INJECTION INTRAVENOUS at 08:40

## 2017-11-22 RX ADMIN — HYDROMORPHONE HYDROCHLORIDE 0.5 MILLIGRAM(S): 2 INJECTION INTRAMUSCULAR; INTRAVENOUS; SUBCUTANEOUS at 10:11

## 2017-11-22 RX ADMIN — Medication 30 MILLIGRAM(S): at 09:04

## 2017-11-22 RX ADMIN — OXYCODONE AND ACETAMINOPHEN 2 TABLET(S): 5; 325 TABLET ORAL at 10:49

## 2017-11-22 NOTE — BRIEF OPERATIVE NOTE - PROCEDURE
<<-----Click on this checkbox to enter Procedure Manipulation of knee joint under anesthesia  11/22/2017  Right  Active  DAISY

## 2017-11-23 ENCOUNTER — TRANSCRIPTION ENCOUNTER (OUTPATIENT)
Age: 67
End: 2017-11-23

## 2017-11-24 ENCOUNTER — OUTPATIENT (OUTPATIENT)
Dept: OUTPATIENT SERVICES | Facility: HOSPITAL | Age: 67
LOS: 1 days | End: 2017-11-24
Payer: MEDICARE

## 2017-11-24 DIAGNOSIS — Z51.89 ENCOUNTER FOR OTHER SPECIFIED AFTERCARE: ICD-10-CM

## 2017-11-24 DIAGNOSIS — Z98.890 OTHER SPECIFIED POSTPROCEDURAL STATES: Chronic | ICD-10-CM

## 2017-11-24 DIAGNOSIS — Z96.651 PRESENCE OF RIGHT ARTIFICIAL KNEE JOINT: Chronic | ICD-10-CM

## 2017-11-28 DIAGNOSIS — M17.11 UNILATERAL PRIMARY OSTEOARTHRITIS, RIGHT KNEE: ICD-10-CM

## 2017-12-04 ENCOUNTER — APPOINTMENT (OUTPATIENT)
Dept: FAMILY MEDICINE | Facility: CLINIC | Age: 67
End: 2017-12-04
Payer: MEDICARE

## 2017-12-04 VITALS
TEMPERATURE: 98.7 F | WEIGHT: 196 LBS | BODY MASS INDEX: 36.07 KG/M2 | SYSTOLIC BLOOD PRESSURE: 168 MMHG | OXYGEN SATURATION: 99 % | DIASTOLIC BLOOD PRESSURE: 78 MMHG | HEART RATE: 67 BPM | HEIGHT: 62 IN

## 2017-12-04 DIAGNOSIS — Z98.890 OTHER SPECIFIED POSTPROCEDURAL STATES: ICD-10-CM

## 2017-12-04 PROCEDURE — 99214 OFFICE O/P EST MOD 30 MIN: CPT | Mod: 25

## 2017-12-04 PROCEDURE — 83036 HEMOGLOBIN GLYCOSYLATED A1C: CPT | Mod: QW

## 2017-12-06 LAB — HBA1C MFR BLD HPLC: 6.1

## 2017-12-07 ENCOUNTER — APPOINTMENT (OUTPATIENT)
Dept: ORTHOPEDIC SURGERY | Facility: CLINIC | Age: 67
End: 2017-12-07
Payer: MEDICARE

## 2017-12-07 ENCOUNTER — APPOINTMENT (OUTPATIENT)
Dept: GASTROENTEROLOGY | Facility: CLINIC | Age: 67
End: 2017-12-07

## 2017-12-07 VITALS
HEIGHT: 62 IN | SYSTOLIC BLOOD PRESSURE: 144 MMHG | BODY MASS INDEX: 36.07 KG/M2 | DIASTOLIC BLOOD PRESSURE: 80 MMHG | WEIGHT: 196 LBS | HEART RATE: 83 BPM

## 2017-12-07 DIAGNOSIS — Z47.1 AFTERCARE FOLLOWING JOINT REPLACEMENT SURGERY: ICD-10-CM

## 2017-12-07 DIAGNOSIS — Z96.651 AFTERCARE FOLLOWING JOINT REPLACEMENT SURGERY: ICD-10-CM

## 2017-12-07 PROCEDURE — 99212 OFFICE O/P EST SF 10 MIN: CPT

## 2017-12-20 ENCOUNTER — OTHER (OUTPATIENT)
Age: 67
End: 2017-12-20

## 2017-12-26 NOTE — H&P PST ADULT - EXTREMITIES COMMENTS
Spoke with Elayne Herrera's Dgter - Sx as noted below, Not sure if fever. Pt slept a lot when with her , no appetite.  Pt went to bed at 7:30 Friday night and slept until 10 AM on Sat.  Pt told Dgter no energy. Feeling exhausted. This AM -usually goes for breakfast but this AM backed out of driveway and then drove right back to home.  Pt has alzheimer's.  Last Wed he mentioned to his dgter - not sure what was going on \"maybe he had a little stroke\"  Was feeling light headed/dizzy.  Pt has had a headache.  Pt is very weak.  No slurring of words.  Pt was chilled.  Normal cough - has COPD and still smokes.  Pt is not eating and drinking much - nothing tastes good.  No SOB.  Elayne advised to take Pt to ER to be evaluated today 12/26/17   decreased ROM right knee

## 2017-12-28 PROCEDURE — 97140 MANUAL THERAPY 1/> REGIONS: CPT

## 2017-12-28 PROCEDURE — 97010 HOT OR COLD PACKS THERAPY: CPT

## 2017-12-28 PROCEDURE — 97110 THERAPEUTIC EXERCISES: CPT

## 2018-01-03 ENCOUNTER — APPOINTMENT (OUTPATIENT)
Dept: ORTHOPEDIC SURGERY | Facility: CLINIC | Age: 68
End: 2018-01-03

## 2018-01-03 ENCOUNTER — OTHER (OUTPATIENT)
Age: 68
End: 2018-01-03

## 2018-01-08 ENCOUNTER — APPOINTMENT (OUTPATIENT)
Dept: FAMILY MEDICINE | Facility: CLINIC | Age: 68
End: 2018-01-08

## 2018-01-11 ENCOUNTER — APPOINTMENT (OUTPATIENT)
Dept: ORTHOPEDIC SURGERY | Facility: CLINIC | Age: 68
End: 2018-01-11

## 2018-03-05 ENCOUNTER — APPOINTMENT (OUTPATIENT)
Dept: FAMILY MEDICINE | Facility: CLINIC | Age: 68
End: 2018-03-05

## 2018-04-04 ENCOUNTER — APPOINTMENT (OUTPATIENT)
Dept: FAMILY MEDICINE | Facility: CLINIC | Age: 68
End: 2018-04-04
Payer: MEDICARE

## 2018-04-04 VITALS
TEMPERATURE: 98.8 F | WEIGHT: 196 LBS | DIASTOLIC BLOOD PRESSURE: 84 MMHG | BODY MASS INDEX: 36.07 KG/M2 | SYSTOLIC BLOOD PRESSURE: 171 MMHG | HEIGHT: 62 IN | OXYGEN SATURATION: 96 % | HEART RATE: 76 BPM

## 2018-04-04 DIAGNOSIS — Z96.659 PRESENCE OF UNSPECIFIED ARTIFICIAL KNEE JOINT: ICD-10-CM

## 2018-04-04 DIAGNOSIS — R01.1 CARDIAC MURMUR, UNSPECIFIED: ICD-10-CM

## 2018-04-04 LAB — HBA1C MFR BLD HPLC: 6.3

## 2018-04-04 PROCEDURE — 83036 HEMOGLOBIN GLYCOSYLATED A1C: CPT | Mod: QW

## 2018-04-04 PROCEDURE — 36415 COLL VENOUS BLD VENIPUNCTURE: CPT

## 2018-04-04 PROCEDURE — 99214 OFFICE O/P EST MOD 30 MIN: CPT | Mod: 25

## 2018-04-04 RX ORDER — OXYCODONE 5 MG/1
5 TABLET ORAL
Qty: 60 | Refills: 0 | Status: DISCONTINUED | COMMUNITY
Start: 2017-12-07 | End: 2018-04-04

## 2018-04-04 RX ORDER — IRON ASPGLY,PS/C/B12/FA/CA/SUC 150-25-1
50-100 CAPSULE ORAL
Qty: 30 | Refills: 0 | Status: DISCONTINUED | COMMUNITY
Start: 2017-10-06 | End: 2018-04-04

## 2018-04-04 RX ORDER — OXYCODONE 5 MG/1
5 TABLET ORAL
Qty: 60 | Refills: 0 | Status: DISCONTINUED | COMMUNITY
Start: 2017-10-11 | End: 2018-04-04

## 2018-04-04 RX ORDER — IRON PS COMPLEX/B12/FOLIC ACID 150-25-1
150-1-25 CAPSULE ORAL
Qty: 90 | Refills: 1 | Status: DISCONTINUED | COMMUNITY
Start: 2017-12-04 | End: 2018-04-04

## 2018-04-04 RX ORDER — OXYCODONE 5 MG/1
5 TABLET ORAL
Qty: 80 | Refills: 0 | Status: DISCONTINUED | COMMUNITY
Start: 2017-09-21 | End: 2018-04-04

## 2018-04-05 LAB
BASOPHILS # BLD AUTO: 0.01 K/UL
BASOPHILS NFR BLD AUTO: 0.2 %
EOSINOPHIL # BLD AUTO: 0.16 K/UL
EOSINOPHIL NFR BLD AUTO: 2.9 %
FERRITIN SERPL-MCNC: 87 NG/ML
FOLATE SERPL-MCNC: 17.7 NG/ML
HCT VFR BLD CALC: 35 %
HGB BLD-MCNC: 11.4 G/DL
IMM GRANULOCYTES NFR BLD AUTO: 0.2 %
IRON SATN MFR SERPL: 23 %
IRON SERPL-MCNC: 62 UG/DL
LYMPHOCYTES # BLD AUTO: 2.16 K/UL
LYMPHOCYTES NFR BLD AUTO: 38.7 %
MAN DIFF?: NORMAL
MCHC RBC-ENTMCNC: 27.9 PG
MCHC RBC-ENTMCNC: 32.6 GM/DL
MCV RBC AUTO: 85.6 FL
MONOCYTES # BLD AUTO: 0.29 K/UL
MONOCYTES NFR BLD AUTO: 5.2 %
NEUTROPHILS # BLD AUTO: 2.95 K/UL
NEUTROPHILS NFR BLD AUTO: 52.8 %
PLATELET # BLD AUTO: 333 K/UL
RBC # BLD: 4.09 M/UL
RBC # FLD: 15.4 %
TIBC SERPL-MCNC: 266 UG/DL
UIBC SERPL-MCNC: 204 UG/DL
VIT B12 SERPL-MCNC: 332 PG/ML
WBC # FLD AUTO: 5.58 K/UL

## 2018-04-11 ENCOUNTER — OTHER (OUTPATIENT)
Age: 68
End: 2018-04-11

## 2018-04-23 ENCOUNTER — APPOINTMENT (OUTPATIENT)
Dept: ORTHOPEDIC SURGERY | Facility: CLINIC | Age: 68
End: 2018-04-23
Payer: MEDICARE

## 2018-04-23 VITALS
HEART RATE: 74 BPM | DIASTOLIC BLOOD PRESSURE: 85 MMHG | BODY MASS INDEX: 36.07 KG/M2 | WEIGHT: 196 LBS | HEIGHT: 62 IN | SYSTOLIC BLOOD PRESSURE: 158 MMHG

## 2018-04-23 PROCEDURE — 99213 OFFICE O/P EST LOW 20 MIN: CPT

## 2018-04-23 PROCEDURE — 73562 X-RAY EXAM OF KNEE 3: CPT | Mod: RT

## 2018-04-23 RX ORDER — ASPIRIN 325 MG/1
325 TABLET, FILM COATED ORAL
Refills: 0 | Status: DISCONTINUED | COMMUNITY
End: 2018-04-23

## 2018-05-18 ENCOUNTER — APPOINTMENT (OUTPATIENT)
Dept: CARDIOLOGY | Facility: CLINIC | Age: 68
End: 2018-05-18

## 2018-06-11 ENCOUNTER — RX RENEWAL (OUTPATIENT)
Age: 68
End: 2018-06-11

## 2018-06-18 ENCOUNTER — APPOINTMENT (OUTPATIENT)
Dept: FAMILY MEDICINE | Facility: CLINIC | Age: 68
End: 2018-06-18
Payer: MEDICARE

## 2018-06-18 VITALS
BODY MASS INDEX: 35.88 KG/M2 | HEIGHT: 62 IN | OXYGEN SATURATION: 98 % | WEIGHT: 195 LBS | HEART RATE: 63 BPM | TEMPERATURE: 98.1 F | SYSTOLIC BLOOD PRESSURE: 152 MMHG | DIASTOLIC BLOOD PRESSURE: 83 MMHG

## 2018-06-18 DIAGNOSIS — Z01.818 ENCOUNTER FOR OTHER PREPROCEDURAL EXAMINATION: ICD-10-CM

## 2018-06-18 PROCEDURE — 99215 OFFICE O/P EST HI 40 MIN: CPT | Mod: 25

## 2018-06-18 PROCEDURE — 36415 COLL VENOUS BLD VENIPUNCTURE: CPT

## 2018-06-18 PROCEDURE — 83036 HEMOGLOBIN GLYCOSYLATED A1C: CPT | Mod: QW

## 2018-06-18 RX ORDER — MELOXICAM 7.5 MG/1
7.5 TABLET ORAL TWICE DAILY
Qty: 60 | Refills: 0 | Status: DISCONTINUED | COMMUNITY
Start: 2018-04-23 | End: 2018-06-18

## 2018-06-18 NOTE — RESULTS/DATA
[] : results reviewed [ECG Reviewed] : reviewed [Normal] : The 12 - lead ECG is normal [No Acute Ischemia] : No acute ischemia [NSR] : normal sinus rhythm [P Waves Normal] : the P wave is normal [Normal QRS] : the QRS is normal

## 2018-06-19 ENCOUNTER — APPOINTMENT (OUTPATIENT)
Dept: GASTROENTEROLOGY | Facility: CLINIC | Age: 68
End: 2018-06-19
Payer: MEDICARE

## 2018-06-19 VITALS
WEIGHT: 195 LBS | DIASTOLIC BLOOD PRESSURE: 104 MMHG | HEIGHT: 62 IN | BODY MASS INDEX: 35.88 KG/M2 | HEART RATE: 76 BPM | SYSTOLIC BLOOD PRESSURE: 172 MMHG

## 2018-06-19 PROCEDURE — 82270 OCCULT BLOOD FECES: CPT

## 2018-06-19 PROCEDURE — 99204 OFFICE O/P NEW MOD 45 MIN: CPT

## 2018-06-19 RX ORDER — OXYCODONE 5 MG/1
5 TABLET ORAL
Qty: 20 | Refills: 0 | Status: DISCONTINUED | COMMUNITY
Start: 2017-09-05 | End: 2018-06-19

## 2018-06-20 ENCOUNTER — RX RENEWAL (OUTPATIENT)
Age: 68
End: 2018-06-20

## 2018-06-21 ENCOUNTER — RX RENEWAL (OUTPATIENT)
Age: 68
End: 2018-06-21

## 2018-06-21 LAB
ANION GAP SERPL CALC-SCNC: 14 MMOL/L
BASOPHILS # BLD AUTO: 0.01 K/UL
BASOPHILS NFR BLD AUTO: 0.2 %
BUN SERPL-MCNC: 13 MG/DL
CALCIUM SERPL-MCNC: 10 MG/DL
CHLORIDE SERPL-SCNC: 103 MMOL/L
CO2 SERPL-SCNC: 26 MMOL/L
CREAT SERPL-MCNC: 0.99 MG/DL
EOSINOPHIL # BLD AUTO: 0.15 K/UL
EOSINOPHIL NFR BLD AUTO: 2.5 %
GLUCOSE SERPL-MCNC: 100 MG/DL
HCT VFR BLD CALC: 33.5 %
HGB BLD-MCNC: 10.8 G/DL
IMM GRANULOCYTES NFR BLD AUTO: 0.3 %
INR PPP: 0.99 RATIO
LYMPHOCYTES # BLD AUTO: 2.3 K/UL
LYMPHOCYTES NFR BLD AUTO: 37.6 %
MAN DIFF?: NORMAL
MCHC RBC-ENTMCNC: 28.1 PG
MCHC RBC-ENTMCNC: 32.2 GM/DL
MCV RBC AUTO: 87.2 FL
MONOCYTES # BLD AUTO: 0.43 K/UL
MONOCYTES NFR BLD AUTO: 7 %
NEUTROPHILS # BLD AUTO: 3.21 K/UL
NEUTROPHILS NFR BLD AUTO: 52.4 %
PLATELET # BLD AUTO: 347 K/UL
POTASSIUM SERPL-SCNC: 4.7 MMOL/L
PT BLD: 11.2 SEC
RBC # BLD: 3.84 M/UL
RBC # FLD: 15.2 %
SODIUM SERPL-SCNC: 143 MMOL/L
WBC # FLD AUTO: 6.12 K/UL

## 2018-06-21 NOTE — REVIEW OF SYSTEMS
[Patient Intake Form Reviewed] : Patient intake form was reviewed [Pain] : pain [Redness] : redness [FreeTextEntry3] : left eye pain irritation, pain and swelling upper and lower eyelid.

## 2018-06-21 NOTE — ASSESSMENT
[Patient NOT optimized for Surgery at this time] : Patient not optimized for surgery at this time [Other: _____] : [unfilled] [Continue medications as is] : Continue current medications [As per surgery] : as per surgery [FreeTextEntry4] : Pt needs to clear left eye infection in order to be clear for catarct surgery.\par Blood tests done today. [FreeTextEntry7] : hold aspirin 5 days prior.

## 2018-06-21 NOTE — PHYSICAL EXAM

## 2018-06-21 NOTE — ADDENDUM
[FreeTextEntry1] : Pt clear from Clinical stand point, if ok with ophtalmology due to left eye infection. No absolute contraindications for surgical procedure.

## 2018-06-21 NOTE — PLAN
[FreeTextEntry1] : -S/P RT Knee manipulation under anesthesia on 11/22/17.Now w/ pain. Oxycodone #20tabs. Orthpedic referral for re evaluation w/ dr Reyes..\par \par -DM: well control. HbA1c: 5.9%. On metformin daily.\par \par -HTn: on lisinopril. Better control. Pt states is under stress. Will continue monitoring.\par \par -Dyslipidemia: on atorvastatin.\par \par -Anemia:stable\par \par -pending colonoscopy. Schedule GI evaluation tomorrow.\par \par -Mammo: new referral.Penidng\par \par -Gyn: pending.\par \par -Diabetic foot exam: normal.\par \par -Ophthalmology: up to date. Schedule for Catarcat surgery. left eye infection.f/up today.\par \par -Systolic murmur: New cardiology referral.\par

## 2018-06-21 NOTE — HISTORY OF PRESENT ILLNESS
[Diabetes] : diabetes  [Coronary Artery Disease] : no coronary artery disease [Sleep Apnea] : no sleep apnea [COPD] : no COPD [Previous Adverse Anesthesia Reaction] : no previous adverse anesthesia reaction [FreeTextEntry1] : cataract surgery [FreeTextEntry2] : 6/21 and 6/28/18 [FreeTextEntry4] : 68 y/o RF with hx DM, Knee replacement, HTN, Anemia, presents today fro medical clearance for cataract surgery.\par \par pt with left eye subconjunctival hemorrhage, seen by ophthalmology 3 days ago, and states will f/up today.

## 2018-06-27 ENCOUNTER — RX RENEWAL (OUTPATIENT)
Age: 68
End: 2018-06-27

## 2018-07-05 ENCOUNTER — NON-APPOINTMENT (OUTPATIENT)
Age: 68
End: 2018-07-05

## 2018-07-05 ENCOUNTER — APPOINTMENT (OUTPATIENT)
Dept: CARDIOLOGY | Facility: CLINIC | Age: 68
End: 2018-07-05
Payer: MEDICARE

## 2018-07-05 VITALS
SYSTOLIC BLOOD PRESSURE: 151 MMHG | DIASTOLIC BLOOD PRESSURE: 84 MMHG | BODY MASS INDEX: 35.88 KG/M2 | OXYGEN SATURATION: 97 % | HEART RATE: 68 BPM | HEIGHT: 62 IN | WEIGHT: 195 LBS

## 2018-07-05 DIAGNOSIS — R01.1 CARDIAC MURMUR, UNSPECIFIED: ICD-10-CM

## 2018-07-05 DIAGNOSIS — Z83.3 FAMILY HISTORY OF DIABETES MELLITUS: ICD-10-CM

## 2018-07-05 DIAGNOSIS — Z63.4 DISAPPEARANCE AND DEATH OF FAMILY MEMBER: ICD-10-CM

## 2018-07-05 DIAGNOSIS — Z82.49 FAMILY HISTORY OF ISCHEMIC HEART DISEASE AND OTHER DISEASES OF THE CIRCULATORY SYSTEM: ICD-10-CM

## 2018-07-05 PROCEDURE — 93000 ELECTROCARDIOGRAM COMPLETE: CPT

## 2018-07-05 PROCEDURE — 99204 OFFICE O/P NEW MOD 45 MIN: CPT | Mod: 25

## 2018-07-05 SDOH — SOCIAL STABILITY - SOCIAL INSECURITY: DISSAPEARANCE AND DEATH OF FAMILY MEMBER: Z63.4

## 2018-07-09 ENCOUNTER — APPOINTMENT (OUTPATIENT)
Dept: FAMILY MEDICINE | Facility: CLINIC | Age: 68
End: 2018-07-09
Payer: MEDICARE

## 2018-07-09 VITALS
BODY MASS INDEX: 30.05 KG/M2 | OXYGEN SATURATION: 95 % | SYSTOLIC BLOOD PRESSURE: 149 MMHG | WEIGHT: 176 LBS | DIASTOLIC BLOOD PRESSURE: 81 MMHG | HEART RATE: 54 BPM | HEIGHT: 64 IN

## 2018-07-09 VITALS
HEIGHT: 62 IN | BODY MASS INDEX: 36.25 KG/M2 | HEART RATE: 70 BPM | OXYGEN SATURATION: 98 % | WEIGHT: 197 LBS | DIASTOLIC BLOOD PRESSURE: 83 MMHG | TEMPERATURE: 98.5 F | SYSTOLIC BLOOD PRESSURE: 142 MMHG

## 2018-07-09 DIAGNOSIS — H26.9 UNSPECIFIED CATARACT: ICD-10-CM

## 2018-07-09 DIAGNOSIS — Z98.49 CATARACT EXTRACTION STATUS, UNSPECIFIED EYE: ICD-10-CM

## 2018-07-09 PROCEDURE — 99214 OFFICE O/P EST MOD 30 MIN: CPT

## 2018-07-09 NOTE — HISTORY OF PRESENT ILLNESS
[FreeTextEntry1] : DM f/up [de-identified] : Pt s/p Cataract surgery.\par S/P RT knee manipulation under anesthesia done 11/22/17.reports was doing well, and now has pain again.\par \par Pt is here today for f/up on DM.\par \par Compliant w/ meds. \par States did not sleep last night, due to stress situation at home w/ daughter.

## 2018-07-09 NOTE — PHYSICAL EXAM
[No Acute Distress] : no acute distress [Normal Outer Ear/Nose] : the outer ears and nose were normal in appearance [No JVD] : no jugular venous distention [No Respiratory Distress] : no respiratory distress  [Normal Rate] : normal rate  [Regular Rhythm] : with a regular rhythm [No Carotid Bruits] : no carotid bruits [No Edema] : there was no peripheral edema [Normal Supraclavicular Nodes] : no supraclavicular lymphadenopathy [No CVA Tenderness] : no CVA  tenderness [No Joint Swelling] : no joint swelling [Normal Gait] : normal gait [de-identified] : 1/3 systolic murmur

## 2018-07-09 NOTE — COUNSELING
[Weight management counseling provided] : Weight management [Healthy eating counseling provided] : healthy eating [Activity counseling provided] : activity [Behavioral health counseling provided] : behavioral health  [Decrease Portions] : Decrease food portions [___ min/wk activity recommended] : [unfilled] min/wk activity recommended [Walking] : Walking [None] : None [Good understanding] : Patient has a good understanding of lifestyle changes and the steps needed to achieve self management goals

## 2018-07-09 NOTE — ASSESSMENT
[FreeTextEntry1] : -S/P cataract surgery: doing well.\par \par -S/P RT Knee manipulation under anesthesia on 11/22/17.Now w/ pain. Oxycodone #20tabs. Orthpedic referral for re evaluation w/ dr Reyes..\par \par -DM: well control. HbA1c: 5.9% (6/18/2018). On metformin daily.\par \par -HTn: on lisinopril. Not well control. Pt states is under stress. Will continue monitoring.\par \par -Dyslipidemia: on atorvastatin.\par \par -Anemia: Anemia profile today.\par \par -pending colonoscopy.: scuedule for 8/2018.\par \par -Mammo: Done at [facility] last week.\par \par -Gyn: pending.\par \par -Diabetic foot exam: normal.\par \par -Ophthalmology: up to date. seen recently.\par \par -Systolic murmur: Seen by cardiology..\par \par

## 2018-07-13 ENCOUNTER — APPOINTMENT (OUTPATIENT)
Dept: CARDIOLOGY | Facility: CLINIC | Age: 68
End: 2018-07-13

## 2018-07-16 ENCOUNTER — RX RENEWAL (OUTPATIENT)
Age: 68
End: 2018-07-16

## 2018-08-02 ENCOUNTER — OTHER (OUTPATIENT)
Age: 68
End: 2018-08-02

## 2018-08-09 ENCOUNTER — APPOINTMENT (OUTPATIENT)
Dept: ORTHOPEDIC SURGERY | Facility: CLINIC | Age: 68
End: 2018-08-09
Payer: MEDICARE

## 2018-08-09 VITALS
BODY MASS INDEX: 36.25 KG/M2 | SYSTOLIC BLOOD PRESSURE: 149 MMHG | HEART RATE: 66 BPM | DIASTOLIC BLOOD PRESSURE: 84 MMHG | HEIGHT: 62 IN | WEIGHT: 197 LBS

## 2018-08-09 DIAGNOSIS — Z96.651 PRESENCE OF RIGHT ARTIFICIAL KNEE JOINT: ICD-10-CM

## 2018-08-09 DIAGNOSIS — Z12.11 ENCOUNTER FOR SCREENING FOR MALIGNANT NEOPLASM OF COLON: ICD-10-CM

## 2018-08-09 PROBLEM — R12 HEARTBURN: Chronic | Status: ACTIVE | Noted: 2017-11-17

## 2018-08-09 PROBLEM — E11.9 TYPE 2 DIABETES MELLITUS WITHOUT COMPLICATIONS: Chronic | Status: ACTIVE | Noted: 2017-08-04

## 2018-08-09 PROBLEM — M19.90 UNSPECIFIED OSTEOARTHRITIS, UNSPECIFIED SITE: Chronic | Status: ACTIVE | Noted: 2017-08-04

## 2018-08-09 PROBLEM — K21.9 GASTRO-ESOPHAGEAL REFLUX DISEASE WITHOUT ESOPHAGITIS: Chronic | Status: ACTIVE | Noted: 2017-08-04

## 2018-08-09 PROBLEM — I10 ESSENTIAL (PRIMARY) HYPERTENSION: Chronic | Status: ACTIVE | Noted: 2017-08-04

## 2018-08-09 PROBLEM — R00.2 PALPITATIONS: Chronic | Status: ACTIVE | Noted: 2017-11-17

## 2018-08-09 PROBLEM — E78.5 HYPERLIPIDEMIA, UNSPECIFIED: Chronic | Status: ACTIVE | Noted: 2017-08-04

## 2018-08-09 PROBLEM — G47.30 SLEEP APNEA, UNSPECIFIED: Chronic | Status: ACTIVE | Noted: 2017-08-04

## 2018-08-09 PROCEDURE — 99213 OFFICE O/P EST LOW 20 MIN: CPT

## 2018-08-09 PROCEDURE — 73562 X-RAY EXAM OF KNEE 3: CPT | Mod: RT

## 2018-08-14 NOTE — CONSULT NOTE ADULT - PROBLEM SELECTOR PROBLEM 5
HPI Comments: 78 y.o. female with past medical history significant for HTN, NHL, GERD, anemia who presents with chief complaint of weakness. Patient complains of generalized weakness, nausea, and poor appetite. Daughter reports 10 lbs weight gain over the past 72 hours and suspect this is from fluid build up on patient's abdomen. Patient complains of left facial numbness and pain; pain is \"numb, stabbing, burning in nature. 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 Daughter reports patient had PET scan about 1 month ago that showed 4.5 cm lesion on left hip. Patient denies any hip pain but complains of mild left-sided abdominal pain that radiates down. Patient finished 6 rounds of chemo from March to July of this year. No fever. There are no other acute medical concerns at this time. Social hx: Current Smoker. Rare EtOH Use. PCP: Ryan Nicholson MD  Oncologist: Dr. Manjinder Hilton     Note written by Katharine Neff. Albino Sandoval, as dictated by Haylie Becerril MD 11:28 AM      The history is provided by the patient and a relative. Past Medical History:   Diagnosis Date    Anemia NEC     Arrhythmia     VSD; TRICUSPID REGURG    Cancer ([address] 2010    low grade NHL    GERD (gastroesophageal reflux disease)     Hypertension     NHL (non-Hodgkin's lymphoma) ([address] 2010    received chemo and radiation. not in remission.     Pleural effusion 02/21/2018    LEFT    Pulmonary hypertension ([address] 02/21/2018    PER CARDIOLOGY NOTES FROM PTS VISIT ON 2/21/18    Thyroid disease        Past Surgical History:   Procedure Laterality Date    HX CATARACT REMOVAL  6/2015 & 7/2015    MERRY    HX CHOLECYSTECTOMY  1982    HX DILATION AND CURETTAGE      HX HEENT  2005    RIGHT EAR     HX OTHER SURGICAL      LEFT PAROTIDECTOMY    CA RPLCMT PROST AORTIC VALVE OPEN XCP HOMOGRF/STENT  1972    STENT PLACED TO STRAIGHTEN THE AORTA         Family History:   Problem Relation Age of Onset    Heart Disease Mother     Kidney Disease Mother     Alcohol abuse Father     Liver Disease Father     Cancer Sister      Breast    No Known Problems Brother     Cancer Sister      Colon    No Known Problems Sister     No Known Problems Sister     No Known Problems Daughter     Anesth Problems Neg Hx        Social History     Social History    Marital status:      Spouse name: N/A    Number of children: N/A    Years of education: N/A     Occupational History    Not on file. Social History Main Topics    Smoking status: Current Every Day Smoker     Packs/day: 0.50     Years: 45.00     Types: Cigarettes    Smokeless tobacco: Former User     Quit date: 12/1/2015    Alcohol use Yes      Comment: Rare    Drug use: No    Sexual activity: No      Comment:  has one child     Other Topics Concern    Not on file     Social History Narrative         ALLERGIES: Rituxan [rituximab]    Review of Systems   Constitutional: Positive for appetite change. Negative for chills and fever. HENT: Negative for rhinorrhea, sore throat and trouble swallowing. Eyes: Negative for photophobia. Respiratory: Positive for shortness of breath. Negative for cough. Cardiovascular: Negative for chest pain and palpitations. Gastrointestinal: Positive for abdominal pain and nausea. Negative for vomiting. Genitourinary: Negative for dysuria, frequency and hematuria. Musculoskeletal: Negative for arthralgias. Neurological: Positive for dizziness, weakness and numbness. Negative for syncope. Psychiatric/Behavioral: Negative for behavioral problems. The patient is not nervous/anxious. All other systems reviewed and are negative. Vitals:    08/14/18 1113   BP: 104/64   Pulse: 82   Resp: 22   Temp: 97.9 °F (36.6 °C)   SpO2: 96%   Weight: 69.4 kg (153 lb)   Height: 5' 2\" (1.575 m)            Physical Exam   Constitutional: She appears well-developed and well-nourished.    Alopecia   HENT:   Head: Normocephalic and atraumatic. Mouth/Throat: Oropharynx is clear and moist.   Eyes: EOM are normal. Pupils are equal, round, and reactive to light. Neck: Normal range of motion. Neck supple. Cardiovascular: Normal rate, regular rhythm and intact distal pulses. Exam reveals no gallop and no friction rub. Murmur heard. Systolic murmur is present with a grade of 2/6   Pulmonary/Chest: Effort normal. No respiratory distress. She has no wheezes. She has no rales. Abdominal: Soft. She exhibits ascites. There is tenderness in the left upper quadrant. There is no rebound. Protuberant abdomen with ascites. Mild tenderness LUQ adjacent to ribs. Musculoskeletal: Normal range of motion. She exhibits no tenderness. Neurological: She is alert. No cranial nerve deficit. Motor; symmetric   Skin: No erythema. Psychiatric: She has a normal mood and affect. Her behavior is normal.   Nursing note and vitals reviewed. Note written by Mallorie Berrios. Con Sida, as dictated by Ana Whiteside MD 11:30 AM      [facility]      ED Course       Procedures    CONSULT NOTE:  3:04 PM Ana Whiteside MD spoke with Dr. Khoi Ye, Consult for Oncology. Discussed available diagnostic tests and clinical findings. Dr. Khoi Ye recommends admission to the hospitalist for radiation oncology consult and possible therapeutic paracentesis. ED EKG interpretation:  Rhythm: normal sinus rhythm; and regular . Rate (approx.): 80; Axis: left axis deviation; P wave: normal; QRS interval: prolonged; ST/T wave: normal; in  Lead: ; Other findings: RBBB. This EKG was interpreted by Ana Whiteside MD,ED Provider.  4:44 PM Hyperlipidemia, unspecified hyperlipidemia type

## 2018-08-16 ENCOUNTER — APPOINTMENT (OUTPATIENT)
Dept: GASTROENTEROLOGY | Facility: GI CENTER | Age: 68
End: 2018-08-16
Payer: MEDICARE

## 2018-08-16 ENCOUNTER — RESULT REVIEW (OUTPATIENT)
Age: 68
End: 2018-08-16

## 2018-08-16 ENCOUNTER — OUTPATIENT (OUTPATIENT)
Dept: OUTPATIENT SERVICES | Facility: HOSPITAL | Age: 68
LOS: 1 days | End: 2018-08-16
Payer: MEDICARE

## 2018-08-16 DIAGNOSIS — Z86.010 PERSONAL HISTORY OF COLONIC POLYPS: ICD-10-CM

## 2018-08-16 DIAGNOSIS — K63.5 POLYP OF COLON: ICD-10-CM

## 2018-08-16 DIAGNOSIS — Z98.890 OTHER SPECIFIED POSTPROCEDURAL STATES: Chronic | ICD-10-CM

## 2018-08-16 DIAGNOSIS — Z96.651 PRESENCE OF RIGHT ARTIFICIAL KNEE JOINT: Chronic | ICD-10-CM

## 2018-08-16 LAB — GLUCOSE BLDC GLUCOMTR-MCNC: 84 MG/DL — SIGNIFICANT CHANGE UP (ref 70–99)

## 2018-08-16 PROCEDURE — 88305 TISSUE EXAM BY PATHOLOGIST: CPT

## 2018-08-16 PROCEDURE — 45385 COLONOSCOPY W/LESION REMOVAL: CPT | Mod: PT

## 2018-08-16 PROCEDURE — 45385 COLONOSCOPY W/LESION REMOVAL: CPT

## 2018-08-16 PROCEDURE — 82962 GLUCOSE BLOOD TEST: CPT

## 2018-08-16 PROCEDURE — 88305 TISSUE EXAM BY PATHOLOGIST: CPT | Mod: 26

## 2018-08-20 LAB — SURGICAL PATHOLOGY FINAL REPORT - CH: SIGNIFICANT CHANGE UP

## 2018-09-12 ENCOUNTER — APPOINTMENT (OUTPATIENT)
Dept: FAMILY MEDICINE | Facility: CLINIC | Age: 68
End: 2018-09-12
Payer: MEDICARE

## 2018-09-12 VITALS
OXYGEN SATURATION: 97 % | WEIGHT: 197 LBS | HEIGHT: 62 IN | BODY MASS INDEX: 36.25 KG/M2 | HEART RATE: 75 BPM | SYSTOLIC BLOOD PRESSURE: 183 MMHG | TEMPERATURE: 98.4 F | DIASTOLIC BLOOD PRESSURE: 80 MMHG

## 2018-09-12 DIAGNOSIS — D64.9 ANEMIA, UNSPECIFIED: ICD-10-CM

## 2018-09-12 DIAGNOSIS — K29.00 ACUTE GASTRITIS W/OUT BLEEDING: ICD-10-CM

## 2018-09-12 PROCEDURE — 99214 OFFICE O/P EST MOD 30 MIN: CPT | Mod: 25

## 2018-09-12 PROCEDURE — 36415 COLL VENOUS BLD VENIPUNCTURE: CPT

## 2018-09-12 NOTE — HISTORY OF PRESENT ILLNESS
[FreeTextEntry8] : Pt presents today for evaluation of left anterior chest burning for aprox 1 week.\par Poor appetite, not eating at all.\par + heartburn, dyspepsia and reflux.\par + dark stools, darker than usual.\par denies similar episodes in the past.\par denies N/V/D.\par

## 2018-09-12 NOTE — ASSESSMENT
[FreeTextEntry1] : Acute gastritis: \par -EKG: NSR\par -Rectal exam: Guaiac negtaive\par -H.Pylori\par -Start omeprazole bid+ maalox. Avoid food irritatns.\par -GI referral for EGD.\par \par -S/P cataract surgery 6/2018: doing well.\par \par -S/P RT Knee manipulation under anesthesia on 11/22/17\par \par -DM: well control. HbA1c: 5.9% (6/18/2018). On metformin daily. repeat HbA1c today\par \par -HTn: on lisinopril. Not well control. Pt states is under stress. Will continue monitoring.\par \par -Dyslipidemia: on atorvastatin.\par \par -Anemia: Repeat H/H, ferritin.\par \par - colonoscopy.: 8/2018.\par \par -Mammo: Done at [facility] 7/5/2018. BIRAD 1\par \par -Gyn: pending.\par \par -Diabetic foot exam: normal.\par \par -Ophthalmology: up to date. seen recently.\par \par -Systolic murmur: Seen by cardiology..\par \par

## 2018-09-13 LAB
FERRITIN SERPL-MCNC: 63 NG/ML
HBA1C MFR BLD HPLC: 5.9 %
HCT VFR BLD CALC: 34 %
HGB BLD-MCNC: 10.8 G/DL

## 2018-09-18 ENCOUNTER — TRANSCRIPTION ENCOUNTER (OUTPATIENT)
Age: 68
End: 2018-09-18

## 2018-09-20 LAB — H PYLORI AG STL QL: NEGATIVE

## 2018-09-21 ENCOUNTER — APPOINTMENT (OUTPATIENT)
Dept: FAMILY MEDICINE | Facility: CLINIC | Age: 68
End: 2018-09-21

## 2018-10-01 ENCOUNTER — APPOINTMENT (OUTPATIENT)
Dept: CARDIOLOGY | Facility: CLINIC | Age: 68
End: 2018-10-01

## 2018-11-26 NOTE — H&P PST ADULT - BP NONINVASIVE SYSTOLIC (MM HG)
Reason For Visit  JAS ROMO is here today for a nurse visit for immunizations MMR and TDAP for school requirement.      Assessment   1. Encounter for immunization (V03.89) (Z23)    Plan   1. MMR   2. Tdap (Adacel)    Patient Instructions TDAP = right shoulder  MMR = left upper arm  MMR #2 will be due ON or AFTER 02.05.2017  SITES OF IMMUNIZATION MAY BE SORE - THIS IS NORMAL  You can take ibuprofen or tylenol if necessary.  A cold compress can also be applied to the site.        Signatures   Electronically signed by : WILLIAMS ROBERTS; Jan 5 2017  9:37AM CST     150

## 2018-12-29 NOTE — PHYSICAL THERAPY INITIAL EVALUATION ADULT - REHAB POTENTIAL, PT EVAL
- last CD4 in 11/18 was wnl (594) - likely 2/2 sepsis  - EKG with sinus tach, no TWI  - admit to tele for further monitoring Pt is functionally independent and not in need of skilled PT, will no longer follow/none - likely 2/2 sepsis  - EKG with sinus tach, no TWI  - admit to tele for further monitoring  - CTA negative for PE - stable L sided axiliary lymphadenopathy on CTA  - pt not receiving treatment at this time

## 2019-01-18 ENCOUNTER — APPOINTMENT (OUTPATIENT)
Dept: FAMILY MEDICINE | Facility: CLINIC | Age: 69
End: 2019-01-18
Payer: MEDICARE

## 2019-01-18 VITALS
HEIGHT: 62 IN | BODY MASS INDEX: 2.26 KG/M2 | WEIGHT: 12.31 LBS | DIASTOLIC BLOOD PRESSURE: 90 MMHG | OXYGEN SATURATION: 99 % | TEMPERATURE: 98.4 F | SYSTOLIC BLOOD PRESSURE: 164 MMHG | HEART RATE: 88 BPM

## 2019-01-18 VITALS — DIASTOLIC BLOOD PRESSURE: 84 MMHG | SYSTOLIC BLOOD PRESSURE: 140 MMHG

## 2019-01-18 DIAGNOSIS — E11.9 TYPE 2 DIABETES MELLITUS W/OUT COMPLICATIONS: ICD-10-CM

## 2019-01-18 LAB
BASOPHILS # BLD AUTO: 0.01 K/UL
BASOPHILS NFR BLD AUTO: 0.1 %
EOSINOPHIL # BLD AUTO: 0.14 K/UL
EOSINOPHIL NFR BLD AUTO: 1.8 %
HBA1C MFR BLD HPLC: 5.9
HCT VFR BLD CALC: 38.1 %
HGB BLD-MCNC: 12.2 G/DL
IMM GRANULOCYTES NFR BLD AUTO: 0.3 %
LYMPHOCYTES # BLD AUTO: 2.32 K/UL
LYMPHOCYTES NFR BLD AUTO: 30.5 %
MAN DIFF?: NORMAL
MCHC RBC-ENTMCNC: 28 PG
MCHC RBC-ENTMCNC: 32 GM/DL
MCV RBC AUTO: 87.6 FL
MONOCYTES # BLD AUTO: 0.45 K/UL
MONOCYTES NFR BLD AUTO: 5.9 %
NEUTROPHILS # BLD AUTO: 4.66 K/UL
NEUTROPHILS NFR BLD AUTO: 61.4 %
PLATELET # BLD AUTO: 389 K/UL
RBC # BLD: 4.35 M/UL
RBC # FLD: 14.5 %
WBC # FLD AUTO: 7.6 K/UL

## 2019-01-18 PROCEDURE — 99214 OFFICE O/P EST MOD 30 MIN: CPT | Mod: 25

## 2019-01-18 PROCEDURE — 36415 COLL VENOUS BLD VENIPUNCTURE: CPT

## 2019-01-18 PROCEDURE — 83036 HEMOGLOBIN GLYCOSYLATED A1C: CPT | Mod: QW

## 2019-01-18 RX ORDER — CALCIUM CARBONATE/SIMETHICONE 1000-60 MG
1000-60 TABLET,CHEWABLE ORAL
Qty: 60 | Refills: 0 | Status: DISCONTINUED | COMMUNITY
Start: 2018-09-12 | End: 2019-01-18

## 2019-01-18 RX ORDER — BIOTIN/FOLIC ACID/VIT BCOMP,C 5MG-0.8MG
5 TABLET ORAL
Qty: 90 | Refills: 1 | Status: ACTIVE | COMMUNITY
Start: 2019-01-18

## 2019-01-18 RX ORDER — ASPIRIN ENTERIC COATED TABLETS 81 MG 81 MG/1
81 TABLET, DELAYED RELEASE ORAL
Refills: 0 | Status: DISCONTINUED | COMMUNITY
End: 2019-01-18

## 2019-01-18 RX ORDER — SODIUM SULFATE, POTASSIUM SULFATE, MAGNESIUM SULFATE 17.5; 3.13; 1.6 G/ML; G/ML; G/ML
17.5-3.13-1.6 SOLUTION, CONCENTRATE ORAL
Qty: 1 | Refills: 0 | Status: DISCONTINUED | COMMUNITY
Start: 2018-06-19 | End: 2019-01-18

## 2019-01-18 RX ORDER — FERROUS SULFATE 325(65) MG
325 (65 FE) TABLET ORAL
Refills: 0 | Status: ACTIVE | COMMUNITY
Start: 2019-01-18

## 2019-01-18 NOTE — ASSESSMENT
PEDIATRIC UROLOGY CONSULTATION NOTE    Oliver Drew is being seen at the request of Alex Singh MD for the chief complaint of hypospadias.    HISTORY OF PRESENT ILLNESS:  Oliver Drew is a 7-week-old male who comes in for chief complaint of hypospadias.  Mom and dad are available for the history taking.  They noted that the penis had hypospadias immediately after birth.  They report that the urinary stream has been visualized.  It is straight and no evidence of deflection or spraying.  They do not recognize any curvature or twisting on the penis.  He is making wet diapers.  There have been no problems with urinary tract infections.  No problems with balanitis.  He is currently sustained with breastmilk and formula.  He is gaining weight.  Parents would like him to be circumcised.  He was conceived naturally.  No medications or hormones were taken during pregnancy.     BIRTH HISTORY: He was born full-term via vaginal delivery.  There were no complications.    PAST MEDICAL HISTORY:  Hypospadias     PAST SURGICAL HISTORY: None     MEDICATIONS: None     ALLERGIES: No known drug allergies.     FAMILY HISTORY:  Mom and dad are reported to be healthy.  There is an older sister who is healthy.  Maternal grandmother has a history of liver cancer and other grandparents are reported to be healthy.  There are no bleeding disorders or anesthetic reactions reported in the family.     SOCIAL HISTORY: Currently lives at home with both parents and sister.  There are no pets or smokers in the household.     REVIEW OF SYSTEMS:  Constitutional: Normal  Allergies: Normal   Neurologic: Normal   Eyes: Normal  ENT: Normal  Respiratory: Normal    Cardiovascular: Normal   Gastrointestinal: Normal   Genitourinary: As per HPI  Endocrine: Normal  Skin: Normal   Musculoskeletal: Normal   Hematologic/Lymphatic: Normal   Psychiatric: Normal       PHYSICAL EXAMINATION:  Visit Vitals  Pulse 156   Temp 97.5 °F (36.4 °C)   Ht 22.84\" (58 cm)   Wt  [FreeTextEntry1] : -S/P cataract surgery: doing well.\par \par -S/P RT Knee manipulation under anesthesia on 11/22/17.w/ dr Reyes..\par \par -DM: well control. HbA1c: 5.9% \par  On metformin daily.\par \par -HTn: \par On lisinopril. better control. Pt states is under stress. Will continue monitoring.\par \par -Dyslipidemia: on atorvastatin.\par \par -Anemia: \par On iron otc.\par Ferritin today\par \par -colonoscopy.:8/2018. w/ Dr Hernandez.\par \par -Mammo: 7/2018\par \par -Gyn: pending.Pt will schedule appointment\par \par -Diabetic foot exam: normal.\par \par -Ophthalmology: 6/2018\par \par -Systolic murmur: Seen by cardiology..\par  Immunizations: refused. (!) 6.18 kg (13 lb 10 oz)   SpO2 99%   BMI 18.37 kg/m²     General: No apparent distress, awake and  cooing  Head: Normocephalic, atraumatic  Eyes: Pupils equal and reactive to light, sclera white, conjunctiva clear  Ears: Pinnae normal bilaterally, auditory canal clear, no discharge  Nose: Normal configuration, with intact septum, no discharge  Mouth: Healthy appearing dentition and gingiva, midline uvula, normal appearing tongue  Neck: Soft and supple with no masses. Midline trachea  Cardiac: Regular rate, and rhythm  Lungs: Clear to auscultation bilaterally, no wheezes or rhonchi  Abdomen: Soft, Non-distended, non-tender. No inguinal bulge  Genitourinary:  Hypospadiac penis.  Urethral meatus is noted to be at the mid shaft/proximal shaft.  There is a nice urethral plate with approximately 8 to 10 mm of width.  The glans is over 10 mm in width.  There is approximately 40 degrees of curvature.  No significant torsion.  Both testicles are visibly descended and palpably normal.  Back: No sonny of hair, or dimpling suggestive of spinal dysraphism.   Anus: Normal position  Musculoskeletal: Arms and legs with good range of motion and strength.     ASSESSMENT:  Oliver Drew is a 7-week-old male with mid shaft hypospadias      PLAN:  We discussed the physical exam findings with the family.  At this point he has a midshaft hypospadias.  There is some chordee present.  He has enough findings that I would recommend surgical reconstruction at 6 months to 18 months of age.  We did go over hypospadias repair very briefly.  We discussed aspects of hypospadias repair including urethroplasty, orthoplasty, glansplasty, scrotoplasty, and giving the penis a circumcised appearance.  We also discussed risks of surgery including infection, bleeding, injury to adjacent structures, as well as potential need for revision surgery.  We did also discussed risks specific to hypospadias including fistula, glans dehiscence, residual curvature,  or meatal stenosis.  The family is in agreement with the plan and will likely move forward with reconstruction around 6 months to 18 months.  I told mom that they should follow-up when Oliver is approximately 6 months old to once again discuss repair hypospadias repair.    They were advised to call the office if any problems or concerns should arise in the interim.    The assessment and recommendations from this consultation will be communicated with the requesting provider through the electronic medical record, fax, or direct phone conversation.       John Wilson MD  Pediatric Urology  [facility]

## 2019-01-24 LAB
25(OH)D3 SERPL-MCNC: 41.9 NG/ML
ALBUMIN SERPL ELPH-MCNC: 4.1 G/DL
ALP BLD-CCNC: 66 U/L
ALT SERPL-CCNC: 12 U/L
ANION GAP SERPL CALC-SCNC: 8 MMOL/L
AST SERPL-CCNC: 17 U/L
BILIRUB SERPL-MCNC: 0.5 MG/DL
BUN SERPL-MCNC: 15 MG/DL
CALCIUM SERPL-MCNC: 10.1 MG/DL
CHLORIDE SERPL-SCNC: 99 MMOL/L
CHOLEST SERPL-MCNC: 210 MG/DL
CHOLEST/HDLC SERPL: 3 RATIO
CO2 SERPL-SCNC: 30 MMOL/L
CREAT SERPL-MCNC: 1.05 MG/DL
CREAT SPEC-SCNC: 277 MG/DL
FERRITIN SERPL-MCNC: 98 NG/ML
GLUCOSE SERPL-MCNC: 87 MG/DL
HDLC SERPL-MCNC: 70 MG/DL
LDLC SERPL CALC-MCNC: 95 MG/DL
MICROALBUMIN 24H UR DL<=1MG/L-MCNC: 1.8 MG/DL
MICROALBUMIN/CREAT 24H UR-RTO: 7 MG/G
POTASSIUM SERPL-SCNC: 5.4 MMOL/L
PROT SERPL-MCNC: 7.3 G/DL
SODIUM SERPL-SCNC: 137 MMOL/L
TRIGL SERPL-MCNC: 225 MG/DL
TSH SERPL-ACNC: 0.77 UIU/ML

## 2019-01-30 ENCOUNTER — APPOINTMENT (OUTPATIENT)
Dept: FAMILY MEDICINE | Facility: CLINIC | Age: 69
End: 2019-01-30
Payer: MEDICARE

## 2019-01-30 VITALS
SYSTOLIC BLOOD PRESSURE: 172 MMHG | WEIGHT: 194 LBS | DIASTOLIC BLOOD PRESSURE: 76 MMHG | HEIGHT: 62 IN | TEMPERATURE: 98.4 F | BODY MASS INDEX: 35.7 KG/M2 | HEART RATE: 63 BPM | OXYGEN SATURATION: 99 %

## 2019-01-30 DIAGNOSIS — Z01.419 ENCOUNTER FOR GYNECOLOGICAL EXAMINATION (GENERAL) (ROUTINE) W/OUT ABNORMAL FINDINGS: ICD-10-CM

## 2019-01-30 PROCEDURE — 99214 OFFICE O/P EST MOD 30 MIN: CPT | Mod: 25

## 2019-01-30 NOTE — HISTORY OF PRESENT ILLNESS
[Menopause Age: ____] : age at menopause was [unfilled] [1 Year Ago] : 1 year ago [Good] : being in good health [Healthy Diet] : a healthy diet [Regular Exercise] : regular exercise [Up to Date] : up to date with ~his/her~ STD screening [Weight Concerns] : no concerns with her weight [Menstrual Problems] : reports normal menses

## 2019-01-30 NOTE — COUNSELING
[Breast Self Exam] : breast self exam [Nutrition] : nutrition [Exercise] : exercise [Confidentiality] : confidentiality [Lab Results] : lab results [Body Image] : body image [Weight Management] : weight management

## 2019-02-01 LAB — HPV HIGH+LOW RISK DNA PNL CVX: NOT DETECTED

## 2019-02-04 LAB — CYTOLOGY CVX/VAG DOC THIN PREP: NORMAL

## 2019-03-06 ENCOUNTER — RX RENEWAL (OUTPATIENT)
Age: 69
End: 2019-03-06

## 2019-05-29 ENCOUNTER — APPOINTMENT (OUTPATIENT)
Dept: FAMILY MEDICINE | Facility: CLINIC | Age: 69
End: 2019-05-29
Payer: MEDICARE

## 2019-05-29 VITALS
BODY MASS INDEX: 33.86 KG/M2 | HEART RATE: 70 BPM | WEIGHT: 184 LBS | OXYGEN SATURATION: 96 % | DIASTOLIC BLOOD PRESSURE: 94 MMHG | TEMPERATURE: 98.1 F | SYSTOLIC BLOOD PRESSURE: 169 MMHG | HEIGHT: 62 IN

## 2019-05-29 DIAGNOSIS — K21.9 GASTRO-ESOPHAGEAL REFLUX DISEASE W/OUT ESOPHAGITIS: ICD-10-CM

## 2019-05-29 PROCEDURE — 83036 HEMOGLOBIN GLYCOSYLATED A1C: CPT | Mod: QW

## 2019-05-29 PROCEDURE — 99214 OFFICE O/P EST MOD 30 MIN: CPT | Mod: 25

## 2019-05-29 NOTE — PHYSICAL EXAM
[No Acute Distress] : no acute distress [Well Nourished] : well nourished [Well Developed] : well developed [Well-Appearing] : well-appearing [Normal Sclera/Conjunctiva] : normal sclera/conjunctiva [PERRL] : pupils equal round and reactive to light [EOMI] : extraocular movements intact [Normal Outer Ear/Nose] : the outer ears and nose were normal in appearance [Normal Oropharynx] : the oropharynx was normal [Supple] : supple [No JVD] : no jugular venous distention [No Lymphadenopathy] : no lymphadenopathy [Thyroid Normal, No Nodules] : the thyroid was normal and there were no nodules present [No Respiratory Distress] : no respiratory distress  [Clear to Auscultation] : lungs were clear to auscultation bilaterally [No Accessory Muscle Use] : no accessory muscle use [Normal Rate] : normal rate  [Regular Rhythm] : with a regular rhythm [Normal S1, S2] : normal S1 and S2 [No Carotid Bruits] : no carotid bruits [No Murmur] : no murmur heard [No Abdominal Bruit] : a ~M bruit was not heard ~T in the abdomen [No Varicosities] : no varicosities [Pedal Pulses Present] : the pedal pulses are present [No Extremity Clubbing/Cyanosis] : no extremity clubbing/cyanosis [No Edema] : there was no peripheral edema [Non Tender] : non-tender [No Palpable Aorta] : no palpable aorta [Soft] : abdomen soft [No Masses] : no abdominal mass palpated [Non-distended] : non-distended [No HSM] : no HSM [Normal Posterior Cervical Nodes] : no posterior cervical lymphadenopathy [Normal Bowel Sounds] : normal bowel sounds [No CVA Tenderness] : no CVA  tenderness [Normal Anterior Cervical Nodes] : no anterior cervical lymphadenopathy [No Joint Swelling] : no joint swelling [No Spinal Tenderness] : no spinal tenderness [Grossly Normal Strength/Tone] : grossly normal strength/tone [No Rash] : no rash [Normal Gait] : normal gait [Coordination Grossly Intact] : coordination grossly intact [No Focal Deficits] : no focal deficits [Deep Tendon Reflexes (DTR)] : deep tendon reflexes were 2+ and symmetric [Normal Insight/Judgement] : insight and judgment were intact [Normal Affect] : the affect was normal [Comprehensive Foot Exam Normal] : Right and left foot were examined and both feet are normal. No ulcers in either foot. Toes are normal and with full ROM.  Normal tactile sensation with monofilament testing throughout both feet

## 2019-05-29 NOTE — ASSESSMENT
[FreeTextEntry1] : -S/P cataract surgery: doing well.\par \par -S/P RT Knee manipulation under anesthesia on 11/22/17.w/ dr Reyes..\par \par GERD/ epigastric pain\par -R/O H.Pylori\par \par -DM: well control. HbA1c: 5.9% \par  On metformin daily.\par \par -HTn: \par On lisinopril. better control. Pt states is under stress. Will continue monitoring.\par \par -Dyslipidemia: on atorvastatin.\par \par -Anemia: \par On iron otc.\par Ferritin today\par \par -colonoscopy.:8/2018. w/ Dr Hernandez.\par Polypectomy. Needs to repeat in 5 years\par \par -Mammo: 7/2018\par \par -Gyn: PAP 1/2019\par \par -Diabetic foot exam: normal.\par \par -Ophthalmology: 6/2018\par \par -Systolic murmur: Seen by cardiology..\par  Immunizations: refused.

## 2019-05-29 NOTE — HISTORY OF PRESENT ILLNESS
[FreeTextEntry1] : DM f/up [de-identified] : 67 y/o F originally from [place] w/ hx DM well control, HTN, presents today for f/up.\par recently return from [place] where she spend several months.\par Pt s/p Cataract surgery.\par S/P RT knee manipulation under anesthesia done 11/22/17.reports was doing well.\par \par Compliant w/ meds. \par States did not sleep last night, due to recent death in the family. \par Reports epigastric pain on and off, associated / dyspepsia and reflux.

## 2019-05-31 LAB — HBA1C MFR BLD HPLC: 6.2

## 2019-06-03 LAB — H PYLORI AG STL QL: NOT DETECTED

## 2019-06-06 ENCOUNTER — APPOINTMENT (OUTPATIENT)
Dept: GASTROENTEROLOGY | Facility: CLINIC | Age: 69
End: 2019-06-06
Payer: MEDICARE

## 2019-06-06 ENCOUNTER — RX RENEWAL (OUTPATIENT)
Age: 69
End: 2019-06-06

## 2019-06-06 ENCOUNTER — OTHER (OUTPATIENT)
Age: 69
End: 2019-06-06

## 2019-06-06 VITALS
WEIGHT: 182 LBS | BODY MASS INDEX: 33.49 KG/M2 | SYSTOLIC BLOOD PRESSURE: 158 MMHG | HEART RATE: 74 BPM | HEIGHT: 62 IN | RESPIRATION RATE: 15 BRPM | OXYGEN SATURATION: 95 % | DIASTOLIC BLOOD PRESSURE: 87 MMHG

## 2019-06-06 DIAGNOSIS — R10.13 EPIGASTRIC PAIN: ICD-10-CM

## 2019-06-06 PROCEDURE — 99214 OFFICE O/P EST MOD 30 MIN: CPT

## 2019-06-06 NOTE — HISTORY OF PRESENT ILLNESS
[de-identified] : patient with 2 month history of epigastric pain rating to left upper quadrant pulling in nature and wakes her from sleep. Nonspecific related to meals but she is having a decreased appetite. Her last 6 months she's lost about 12 pounds. She also some constipation but no diarrhea rectal bleeding hematemesis or melena.the patient saw her primary care physician last week and was started omeprazole 40 mg per day but developed a cough she thought might be related to the medicine consult was stopped.

## 2019-06-06 NOTE — REVIEW OF SYSTEMS
[As Noted in HPI] : as noted in HPI [Recent Weight Loss (___ Lbs)] : recent [unfilled] ~Ulb weight loss [Negative] : Endocrine

## 2019-06-06 NOTE — PHYSICAL EXAM
[General Appearance - In No Acute Distress] : in no acute distress [General Appearance - Alert] : alert [PERRL With Normal Accommodation] : pupils were equal in size, round, and reactive to light [Extraocular Movements] : extraocular movements were intact [Sclera] : the sclera and conjunctiva were normal [Neck Appearance] : the appearance of the neck was normal [Jugular Venous Distention Increased] : there was no jugular-venous distention [Neck Cervical Mass (___cm)] : no neck mass was observed [Auscultation Breath Sounds / Voice Sounds] : lungs were clear to auscultation bilaterally [Thyroid Nodule] : there were no palpable thyroid nodules [Thyroid Diffuse Enlargement] : the thyroid was not enlarged [Heart Sounds] : normal S1 and S2 [Heart Rate And Rhythm] : heart rate was normal and rhythm regular [Heart Sounds Gallop] : no gallops [Heart Sounds Pericardial Friction Rub] : no pericardial rub [Murmurs] : no murmurs [Bowel Sounds] : normal bowel sounds [Abdomen Tenderness] : non-tender [Abdomen Soft] : soft [Abdomen Mass (___ Cm)] : no abdominal mass palpated [] : no hepato-splenomegaly [Impaired Insight] : insight and judgment were intact [Affect] : the affect was normal [Oriented To Time, Place, And Person] : oriented to person, place, and time

## 2019-06-06 NOTE — ASSESSMENT
[FreeTextEntry1] : i discussed to the patient the given her weight loss and the nature of her pain that'll be appropriate to do an endoscopy to rule out ulcer or neoplasm. The interim I advised to restart the omeprazole but if she did develop a cough again and we would stop it. She will also get routine blood work is medically optimal for planned procedure

## 2019-06-07 ENCOUNTER — CLINICAL ADVICE (OUTPATIENT)
Age: 69
End: 2019-06-07

## 2019-06-07 LAB
BASOPHILS # BLD AUTO: 0.02 K/UL
BASOPHILS NFR BLD AUTO: 0.3 %
EOSINOPHIL # BLD AUTO: 0.23 K/UL
EOSINOPHIL NFR BLD AUTO: 3.8 %
HCT VFR BLD CALC: 32.5 %
HGB BLD-MCNC: 10.5 G/DL
IMM GRANULOCYTES NFR BLD AUTO: 0.2 %
LPL SERPL-CCNC: 436 U/L
LYMPHOCYTES # BLD AUTO: 2.43 K/UL
LYMPHOCYTES NFR BLD AUTO: 40.2 %
MAN DIFF?: NORMAL
MCHC RBC-ENTMCNC: 28.3 PG
MCHC RBC-ENTMCNC: 32.3 GM/DL
MCV RBC AUTO: 87.6 FL
MONOCYTES # BLD AUTO: 0.46 K/UL
MONOCYTES NFR BLD AUTO: 7.6 %
NEUTROPHILS # BLD AUTO: 2.89 K/UL
NEUTROPHILS NFR BLD AUTO: 47.9 %
PLATELET # BLD AUTO: 291 K/UL
RBC # BLD: 3.71 M/UL
RBC # FLD: 13.4 %
WBC # FLD AUTO: 6.04 K/UL

## 2019-06-07 RX ORDER — LANCETS
EACH MISCELLANEOUS
Qty: 90 | Refills: 1 | Status: ACTIVE | COMMUNITY
Start: 2019-06-06 | End: 1900-01-01

## 2019-06-07 RX ORDER — BLOOD-GLUCOSE METER
W/DEVICE EACH MISCELLANEOUS
Qty: 1 | Refills: 0 | Status: ACTIVE | COMMUNITY
Start: 2019-06-06 | End: 1900-01-01

## 2019-06-07 RX ORDER — BLOOD SUGAR DIAGNOSTIC
STRIP MISCELLANEOUS
Qty: 90 | Refills: 1 | Status: ACTIVE | COMMUNITY
Start: 2019-06-06 | End: 1900-01-01

## 2019-06-09 LAB
ALBUMIN SERPL ELPH-MCNC: 4.2 G/DL
ALP BLD-CCNC: 70 U/L
ALT SERPL-CCNC: 16 U/L
ANION GAP SERPL CALC-SCNC: 13 MMOL/L
AST SERPL-CCNC: 17 U/L
BILIRUB SERPL-MCNC: 0.8 MG/DL
BUN SERPL-MCNC: 12 MG/DL
CALCIUM SERPL-MCNC: 9.8 MG/DL
CHLORIDE SERPL-SCNC: 105 MMOL/L
CO2 SERPL-SCNC: 26 MMOL/L
CREAT SERPL-MCNC: 0.96 MG/DL
GLUCOSE SERPL-MCNC: 105 MG/DL
POTASSIUM SERPL-SCNC: 5.2 MMOL/L
PROT SERPL-MCNC: 6.8 G/DL
SODIUM SERPL-SCNC: 143 MMOL/L

## 2019-06-20 ENCOUNTER — EMERGENCY (EMERGENCY)
Facility: HOSPITAL | Age: 69
LOS: 1 days | Discharge: HOME | End: 2019-06-20
Attending: EMERGENCY MEDICINE
Payer: MEDICARE

## 2019-06-20 VITALS
OXYGEN SATURATION: 99 % | SYSTOLIC BLOOD PRESSURE: 155 MMHG | HEART RATE: 79 BPM | DIASTOLIC BLOOD PRESSURE: 71 MMHG | RESPIRATION RATE: 18 BRPM

## 2019-06-20 VITALS — WEIGHT: 182.1 LBS | HEIGHT: 62 IN

## 2019-06-20 DIAGNOSIS — Z96.651 PRESENCE OF RIGHT ARTIFICIAL KNEE JOINT: Chronic | ICD-10-CM

## 2019-06-20 DIAGNOSIS — Z98.890 OTHER SPECIFIED POSTPROCEDURAL STATES: Chronic | ICD-10-CM

## 2019-06-20 LAB
ALBUMIN SERPL ELPH-MCNC: 4.6 G/DL — SIGNIFICANT CHANGE UP (ref 3.3–5.2)
ALP SERPL-CCNC: 82 U/L — SIGNIFICANT CHANGE UP (ref 40–120)
ALT FLD-CCNC: 12 U/L — SIGNIFICANT CHANGE UP
ANION GAP SERPL CALC-SCNC: 13 MMOL/L — SIGNIFICANT CHANGE UP (ref 5–17)
APPEARANCE UR: CLEAR — SIGNIFICANT CHANGE UP
AST SERPL-CCNC: 19 U/L — SIGNIFICANT CHANGE UP
BASOPHILS # BLD AUTO: 0 K/UL — SIGNIFICANT CHANGE UP (ref 0–0.2)
BASOPHILS NFR BLD AUTO: 0.2 % — SIGNIFICANT CHANGE UP (ref 0–2)
BILIRUB SERPL-MCNC: 0.7 MG/DL — SIGNIFICANT CHANGE UP (ref 0.4–2)
BILIRUB UR-MCNC: NEGATIVE — SIGNIFICANT CHANGE UP
BUN SERPL-MCNC: 10 MG/DL — SIGNIFICANT CHANGE UP (ref 8–20)
CALCIUM SERPL-MCNC: 10.4 MG/DL — HIGH (ref 8.6–10.2)
CHLORIDE SERPL-SCNC: 103 MMOL/L — SIGNIFICANT CHANGE UP (ref 98–107)
CO2 SERPL-SCNC: 25 MMOL/L — SIGNIFICANT CHANGE UP (ref 22–29)
COLOR SPEC: YELLOW — SIGNIFICANT CHANGE UP
CREAT SERPL-MCNC: 0.81 MG/DL — SIGNIFICANT CHANGE UP (ref 0.5–1.3)
DIFF PNL FLD: NEGATIVE — SIGNIFICANT CHANGE UP
EOSINOPHIL # BLD AUTO: 0.2 K/UL — SIGNIFICANT CHANGE UP (ref 0–0.5)
EOSINOPHIL NFR BLD AUTO: 2.6 % — SIGNIFICANT CHANGE UP (ref 0–6)
GLUCOSE SERPL-MCNC: 97 MG/DL — SIGNIFICANT CHANGE UP (ref 70–115)
GLUCOSE UR QL: NEGATIVE MG/DL — SIGNIFICANT CHANGE UP
HCT VFR BLD CALC: 34.7 % — LOW (ref 37–47)
HGB BLD-MCNC: 11.5 G/DL — LOW (ref 12–16)
KETONES UR-MCNC: NEGATIVE — SIGNIFICANT CHANGE UP
LEUKOCYTE ESTERASE UR-ACNC: NEGATIVE — SIGNIFICANT CHANGE UP
LIDOCAIN IGE QN: 621 U/L — HIGH (ref 22–51)
LYMPHOCYTES # BLD AUTO: 2.5 K/UL — SIGNIFICANT CHANGE UP (ref 1–4.8)
LYMPHOCYTES # BLD AUTO: 39.9 % — SIGNIFICANT CHANGE UP (ref 20–55)
MCHC RBC-ENTMCNC: 28.2 PG — SIGNIFICANT CHANGE UP (ref 27–31)
MCHC RBC-ENTMCNC: 33.1 G/DL — SIGNIFICANT CHANGE UP (ref 32–36)
MCV RBC AUTO: 85 FL — SIGNIFICANT CHANGE UP (ref 81–99)
MONOCYTES # BLD AUTO: 0.5 K/UL — SIGNIFICANT CHANGE UP (ref 0–0.8)
MONOCYTES NFR BLD AUTO: 7.3 % — SIGNIFICANT CHANGE UP (ref 3–10)
NEUTROPHILS # BLD AUTO: 3.1 K/UL — SIGNIFICANT CHANGE UP (ref 1.8–8)
NEUTROPHILS NFR BLD AUTO: 49.8 % — SIGNIFICANT CHANGE UP (ref 37–73)
NITRITE UR-MCNC: NEGATIVE — SIGNIFICANT CHANGE UP
PH UR: 6.5 — SIGNIFICANT CHANGE UP (ref 5–8)
PLATELET # BLD AUTO: 374 K/UL — SIGNIFICANT CHANGE UP (ref 150–400)
POTASSIUM SERPL-MCNC: 4 MMOL/L — SIGNIFICANT CHANGE UP (ref 3.5–5.3)
POTASSIUM SERPL-SCNC: 4 MMOL/L — SIGNIFICANT CHANGE UP (ref 3.5–5.3)
PROT SERPL-MCNC: 7.8 G/DL — SIGNIFICANT CHANGE UP (ref 6.6–8.7)
PROT UR-MCNC: NEGATIVE MG/DL — SIGNIFICANT CHANGE UP
RBC # BLD: 4.08 M/UL — LOW (ref 4.4–5.2)
RBC # FLD: 13.7 % — SIGNIFICANT CHANGE UP (ref 11–15.6)
SODIUM SERPL-SCNC: 141 MMOL/L — SIGNIFICANT CHANGE UP (ref 135–145)
SP GR SPEC: 1 — LOW (ref 1.01–1.02)
UROBILINOGEN FLD QL: NEGATIVE MG/DL — SIGNIFICANT CHANGE UP
WBC # BLD: 6.3 K/UL — SIGNIFICANT CHANGE UP (ref 4.8–10.8)
WBC # FLD AUTO: 6.3 K/UL — SIGNIFICANT CHANGE UP (ref 4.8–10.8)

## 2019-06-20 PROCEDURE — 80053 COMPREHEN METABOLIC PANEL: CPT

## 2019-06-20 PROCEDURE — 83690 ASSAY OF LIPASE: CPT

## 2019-06-20 PROCEDURE — 96374 THER/PROPH/DIAG INJ IV PUSH: CPT | Mod: XU

## 2019-06-20 PROCEDURE — 96361 HYDRATE IV INFUSION ADD-ON: CPT

## 2019-06-20 PROCEDURE — 74177 CT ABD & PELVIS W/CONTRAST: CPT | Mod: 26

## 2019-06-20 PROCEDURE — 74177 CT ABD & PELVIS W/CONTRAST: CPT

## 2019-06-20 PROCEDURE — 99285 EMERGENCY DEPT VISIT HI MDM: CPT

## 2019-06-20 PROCEDURE — 81003 URINALYSIS AUTO W/O SCOPE: CPT

## 2019-06-20 PROCEDURE — 85027 COMPLETE CBC AUTOMATED: CPT

## 2019-06-20 PROCEDURE — 36415 COLL VENOUS BLD VENIPUNCTURE: CPT

## 2019-06-20 PROCEDURE — 96375 TX/PRO/DX INJ NEW DRUG ADDON: CPT

## 2019-06-20 PROCEDURE — 99284 EMERGENCY DEPT VISIT MOD MDM: CPT | Mod: 25

## 2019-06-20 RX ORDER — ONDANSETRON 8 MG/1
4 TABLET, FILM COATED ORAL ONCE
Refills: 0 | Status: COMPLETED | OUTPATIENT
Start: 2019-06-20 | End: 2019-06-20

## 2019-06-20 RX ORDER — MORPHINE SULFATE 50 MG/1
2 CAPSULE, EXTENDED RELEASE ORAL ONCE
Refills: 0 | Status: DISCONTINUED | OUTPATIENT
Start: 2019-06-20 | End: 2019-06-20

## 2019-06-20 RX ORDER — IBUPROFEN 200 MG
1 TABLET ORAL
Qty: 15 | Refills: 0
Start: 2019-06-20 | End: 2019-06-24

## 2019-06-20 RX ORDER — FAMOTIDINE 10 MG/ML
20 INJECTION INTRAVENOUS ONCE
Refills: 0 | Status: COMPLETED | OUTPATIENT
Start: 2019-06-20 | End: 2019-06-20

## 2019-06-20 RX ORDER — SODIUM CHLORIDE 9 MG/ML
1000 INJECTION, SOLUTION INTRAVENOUS
Refills: 0 | Status: DISCONTINUED | OUTPATIENT
Start: 2019-06-20 | End: 2019-06-25

## 2019-06-20 RX ORDER — SODIUM CHLORIDE 9 MG/ML
1000 INJECTION INTRAMUSCULAR; INTRAVENOUS; SUBCUTANEOUS
Refills: 0 | Status: DISCONTINUED | OUTPATIENT
Start: 2019-06-20 | End: 2019-06-25

## 2019-06-20 RX ORDER — ONDANSETRON 8 MG/1
1 TABLET, FILM COATED ORAL
Qty: 20 | Refills: 0
Start: 2019-06-20 | End: 2019-06-24

## 2019-06-20 RX ADMIN — SODIUM CHLORIDE 125 MILLILITER(S): 9 INJECTION, SOLUTION INTRAVENOUS at 18:25

## 2019-06-20 RX ADMIN — SODIUM CHLORIDE 1000 MILLILITER(S): 9 INJECTION INTRAMUSCULAR; INTRAVENOUS; SUBCUTANEOUS at 18:25

## 2019-06-20 RX ADMIN — MORPHINE SULFATE 2 MILLIGRAM(S): 50 CAPSULE, EXTENDED RELEASE ORAL at 16:38

## 2019-06-20 RX ADMIN — MORPHINE SULFATE 2 MILLIGRAM(S): 50 CAPSULE, EXTENDED RELEASE ORAL at 17:59

## 2019-06-20 RX ADMIN — ONDANSETRON 4 MILLIGRAM(S): 8 TABLET, FILM COATED ORAL at 13:10

## 2019-06-20 RX ADMIN — SODIUM CHLORIDE 1000 MILLILITER(S): 9 INJECTION, SOLUTION INTRAVENOUS at 18:25

## 2019-06-20 RX ADMIN — FAMOTIDINE 20 MILLIGRAM(S): 10 INJECTION INTRAVENOUS at 13:10

## 2019-06-20 RX ADMIN — SODIUM CHLORIDE 100 MILLILITER(S): 9 INJECTION INTRAMUSCULAR; INTRAVENOUS; SUBCUTANEOUS at 13:10

## 2019-06-20 NOTE — ED STATDOCS - OBJECTIVE STATEMENT
69 y/o F pt with PMHx of DM, GERD, heart palpitations, HLD, HTN, osteoarthritis, sleep apnea, presents to the ED c/o abdominal pain. Pain began in April when she was in [place]. Patient states abdominal pain is a constant pulling pain, that radiates to the back. Associated sx of nausea, chills, with limited PO intake due to poor appetite. She has been sleeping less secondary to pain. Believes she is constipated. She was placed on Omeprazole (40mg), by her PCP. Pt has an appointment with Dr. Hernandez in 4 days, for an endoscopy. Denies V/D, fever. 69 y/o F pt with PMHx of DM, GERD, heart palpitations, HLD, HTN, osteoarthritis, sleep apnea, presents to the ED c/o abdominal pain. Pain began in April when she was in [place]. Patient states abdominal pain is a constant pulling pain, that radiates to the back. Associated sx of nausea, chills, with limited PO intake due to poor appetite. She has been sleeping less secondary to pain. Believes she is constipated. She was placed on Omeprazole (40mg), by her PCP. Pt has an appointment with Dr. Hernandez in 4 days, for an endoscopy. Denies V/D, fever.  SUDDEN ONSET EPIGASTRRIC PAIN 2 MONTHS AGO RADIATES LEFT NO FEVER VD OR DYSURIA. INTERMITTENT PAIN

## 2019-06-20 NOTE — ED STATDOCS - CHPI ED SYMPTOM NEG
no fever/no diarrhea/no vomiting no palpitations/no abdominal distention/no burning urination/no fever/no diarrhea/no blood in stool/no vomiting

## 2019-06-20 NOTE — ED ADULT TRIAGE NOTE - CHIEF COMPLAINT QUOTE
c/o abd pain and nausea, decreased appetite  no vomiting no diarrhea,  see PMD who referred to gastro to dr Hernandez but appontment on 6/24 they will do an endoscopy, taking omeprazole, hx htn, dm did not take today lisinopril today

## 2019-06-20 NOTE — ED STATDOCS - PROGRESS NOTE DETAILS
PA Note: PT evaluated by intake physician. HPI/PE/ROS as noted above. Will follow up plan per intake physician. PA Note: pt with evidence of elevated lipase, will order CT to further evaluate. PA Note: CT showing possible lymphadenopathy or mass around pancreas, explained results to pt and daughter in detail. Pt has follow up appt with gastroenterologist, Dr. Hernandez, on Tuesday. Instructed pt to follow up and discuss results with Dr. Hernandez for further evaluation. Pt tolerating PO at this time, no abdominal tenderness on exam. Pt informed of hydrocolpos found on CT as well, instructed to follow up with OBGYN for this.

## 2019-06-20 NOTE — ED STATDOCS - ATTENDING CONTRIBUTION TO CARE
I, Saritha Mak, performed the initial face to face bedside interview with this patient regarding history of present illness, review of symptoms and relevant past medical, social and family history.  I completed an independent physical examination.  I was the initial provider who evaluated this patient. I have signed out the follow up of any pending tests (i.e. labs, radiological studies) to the ACP.  I have communicated the patient’s plan of care and disposition with the ACP.  The history, relevant review of systems, past medical and surgical history, medical decision making, and physical examination was documented by the scribe in my presence and I attest to the accuracy of the documentation.

## 2019-06-24 ENCOUNTER — APPOINTMENT (OUTPATIENT)
Dept: FAMILY MEDICINE | Facility: CLINIC | Age: 69
End: 2019-06-24
Payer: MEDICARE

## 2019-06-24 VITALS
TEMPERATURE: 98.4 F | HEIGHT: 62 IN | OXYGEN SATURATION: 99 % | SYSTOLIC BLOOD PRESSURE: 163 MMHG | HEART RATE: 67 BPM | DIASTOLIC BLOOD PRESSURE: 83 MMHG | WEIGHT: 176 LBS | BODY MASS INDEX: 32.39 KG/M2

## 2019-06-24 DIAGNOSIS — R19.00 INTRA-ABDOMINAL AND PELVIC SWELLING, MASS AND LUMP, UNSPECIFIED SITE: ICD-10-CM

## 2019-06-24 DIAGNOSIS — E11.9 TYPE 2 DIABETES MELLITUS W/OUT COMPLICATIONS: ICD-10-CM

## 2019-06-24 DIAGNOSIS — R10.9 UNSPECIFIED ABDOMINAL PAIN: ICD-10-CM

## 2019-06-24 DIAGNOSIS — R74.8 ABNORMAL LEVELS OF OTHER SERUM ENZYMES: ICD-10-CM

## 2019-06-24 DIAGNOSIS — I10 ESSENTIAL (PRIMARY) HYPERTENSION: ICD-10-CM

## 2019-06-24 PROCEDURE — 99214 OFFICE O/P EST MOD 30 MIN: CPT

## 2019-06-24 RX ORDER — DOCUSATE SODIUM 100 MG/1
100 CAPSULE ORAL TWICE DAILY
Qty: 60 | Refills: 3 | Status: ACTIVE | COMMUNITY
Start: 2019-06-24 | End: 1900-01-01

## 2019-06-24 NOTE — DISCUSSION/SUMMARY
[Specialty: _____] : Specialty: [unfilled] [PCP: _____] : PCP: [unfilled] [Home] : patient was discharged to home [Med Rec Performed] : med rec performed [Follow Up Appt with Provider within 7 days] : follow up appt with provider within 7 days

## 2019-06-24 NOTE — PLAN
[FreeTextEntry7] : 6/24/19 [FreeTextEntry3] : Reconciled medications. Scheduled follow up appointment. \par Patient advised to call office or report to nearest urgent care/ emergency department for abnormal signs and/or symptoms.\par \par

## 2019-06-24 NOTE — ASSESSMENT
[FreeTextEntry1] : 67 y/o F presents for ED f/up \par \par Abdominal pain/ Elevated Lipase:\par -CT 6/20/19:  Abnormal soft tissue is seen about the superior mesenteric \par artery immediately inferior to the pancreas which may reflect \par lymphadenopathy or exophytic pancreatic mass. Clinical workup is needed\par -Pt schedule for EGD w/ Dr Hernandez for 6/25/19\par -Start tramadol and colace prn.\par  \par -IUD is seen within the endometrial canal.: on CT scan 6/20/19  Fluid is also seen within the \par endometrial canal suggesting hydrocolpos, \par GYN follow-up .\par \par \par -S/P cataract surgery: doing well.\par \par -S/P RT Knee manipulation under anesthesia on 11/22/17.w/ dr Reyes..\par \par -DM: well control. HbA1c: 5.9% \par  On metformin daily.\par \par -HTn: \par On lisinopril. better control. Pt states is under stress. Will continue monitoring.\par \par -Dyslipidemia: on atorvastatin.\par \par -Anemia: \par On iron otc.\par Ferritin today\par \par -colonoscopy.:8/2018. w/ Dr Hernandez.\par Polypectomy. Needs to repeat in 5 years\par \par -Mammo: 7/2018\par \par -Gyn: PAP 1/2019\par \par -Diabetic foot exam: normal.\par \par -Ophthalmology: 6/2018\par \par -Systolic murmur: Seen by cardiology..\par  Immunizations: refused. \par

## 2019-06-24 NOTE — DISCUSSION/SUMMARY
[FreeTextEntry1] : post ED visit- spoke with patient, went to ED 6/20/19 for left sided abdominal pain, CT scan was performed and as per patient they found a mass on her pancreas. Patient was to be admitted but they discharged her because of f/u apt w gastroenterology.

## 2019-06-24 NOTE — PHYSICAL EXAM

## 2019-06-24 NOTE — HISTORY OF PRESENT ILLNESS
[FreeTextEntry1] : ED F/up [de-identified] : 69 y/o F originally from [place] w/ hx DM well control, HTN, presents today for f/up after seen in the ED at [facility] on 6/20/19 for abdominal pain.\par Pt had blood tests and CT scan. Lipase: 600's, CT scan:  Abnormal soft tissue is seen about the superior mesenteric artery immediately inferior to the pancreas which may reflect lymphadenopathy or exophytic pancreatic mass. Clinical workup is needed.An \par IUD is seen within the endometrial canal. Fluid is also seen within the endometrial canal suggesting hydrocolpos, GYN follow-up is needed. \par \par PT was seen by GI, Dr Hernandez  on 6/6/19. Schedule for EGD 6/25/19.\par Pt was d/c home with Ibuprofen 600mg and reports not relived of pain. + Nausea, poor appetite.\par  \par

## 2019-06-25 RX ORDER — OMEPRAZOLE 40 MG/1
40 CAPSULE, DELAYED RELEASE ORAL
Qty: 90 | Refills: 1 | Status: ACTIVE | COMMUNITY
Start: 1900-01-01 | End: 1900-01-01

## 2019-07-01 ENCOUNTER — OUTPATIENT (OUTPATIENT)
Dept: OUTPATIENT SERVICES | Facility: HOSPITAL | Age: 69
LOS: 1 days | End: 2019-07-01
Payer: MEDICARE

## 2019-07-01 ENCOUNTER — RESULT REVIEW (OUTPATIENT)
Age: 69
End: 2019-07-01

## 2019-07-01 ENCOUNTER — APPOINTMENT (OUTPATIENT)
Dept: GASTROENTEROLOGY | Facility: HOSPITAL | Age: 69
End: 2019-07-01

## 2019-07-01 DIAGNOSIS — Z98.890 OTHER SPECIFIED POSTPROCEDURAL STATES: Chronic | ICD-10-CM

## 2019-07-01 DIAGNOSIS — R10.13 EPIGASTRIC PAIN: ICD-10-CM

## 2019-07-01 DIAGNOSIS — Z96.651 PRESENCE OF RIGHT ARTIFICIAL KNEE JOINT: Chronic | ICD-10-CM

## 2019-07-01 LAB — GLUCOSE BLDC GLUCOMTR-MCNC: 111 MG/DL — HIGH (ref 70–99)

## 2019-07-01 PROCEDURE — 43242 EGD US FINE NEEDLE BX/ASPIR: CPT

## 2019-07-01 PROCEDURE — 88172 CYTP DX EVAL FNA 1ST EA SITE: CPT

## 2019-07-01 PROCEDURE — 43239 EGD BIOPSY SINGLE/MULTIPLE: CPT | Mod: 59

## 2019-07-01 PROCEDURE — 88342 IMHCHEM/IMCYTCHM 1ST ANTB: CPT

## 2019-07-01 PROCEDURE — 82962 GLUCOSE BLOOD TEST: CPT

## 2019-07-01 PROCEDURE — 88342 IMHCHEM/IMCYTCHM 1ST ANTB: CPT | Mod: 26

## 2019-07-01 PROCEDURE — 88305 TISSUE EXAM BY PATHOLOGIST: CPT

## 2019-07-01 PROCEDURE — 43239 EGD BIOPSY SINGLE/MULTIPLE: CPT | Mod: XS

## 2019-07-01 PROCEDURE — 88305 TISSUE EXAM BY PATHOLOGIST: CPT | Mod: 26

## 2019-07-01 PROCEDURE — 88172 CYTP DX EVAL FNA 1ST EA SITE: CPT | Mod: 26

## 2019-07-01 PROCEDURE — 88173 CYTOPATH EVAL FNA REPORT: CPT

## 2019-07-01 PROCEDURE — 88173 CYTOPATH EVAL FNA REPORT: CPT | Mod: 26

## 2019-07-03 LAB
NON-GYNECOLOGICAL CYTOLOGY STUDY: SIGNIFICANT CHANGE UP
SURGICAL PATHOLOGY STUDY: SIGNIFICANT CHANGE UP

## 2019-07-09 ENCOUNTER — APPOINTMENT (OUTPATIENT)
Dept: GASTROENTEROLOGY | Facility: CLINIC | Age: 69
End: 2019-07-09

## 2019-07-09 ENCOUNTER — RX RENEWAL (OUTPATIENT)
Age: 69
End: 2019-07-09

## 2019-07-17 ENCOUNTER — OUTPATIENT (OUTPATIENT)
Dept: OUTPATIENT SERVICES | Facility: HOSPITAL | Age: 69
LOS: 1 days | Discharge: HOME | End: 2019-07-17
Payer: MEDICARE

## 2019-07-17 DIAGNOSIS — Z98.890 OTHER SPECIFIED POSTPROCEDURAL STATES: Chronic | ICD-10-CM

## 2019-07-17 DIAGNOSIS — C25.9 MALIGNANT NEOPLASM OF PANCREAS, UNSPECIFIED: ICD-10-CM

## 2019-07-17 DIAGNOSIS — Z96.651 PRESENCE OF RIGHT ARTIFICIAL KNEE JOINT: Chronic | ICD-10-CM

## 2019-07-22 ENCOUNTER — APPOINTMENT (OUTPATIENT)
Dept: HEMATOLOGY ONCOLOGY | Facility: CLINIC | Age: 69
End: 2019-07-22

## 2019-07-23 ENCOUNTER — RESULT REVIEW (OUTPATIENT)
Age: 69
End: 2019-07-23

## 2019-07-23 PROCEDURE — 88321 CONSLTJ&REPRT SLD PREP ELSWR: CPT

## 2019-07-24 LAB — NON-GYNECOLOGICAL CYTOLOGY STUDY: SIGNIFICANT CHANGE UP

## 2019-08-13 ENCOUNTER — RX RENEWAL (OUTPATIENT)
Age: 69
End: 2019-08-13

## 2019-08-15 ENCOUNTER — RX RENEWAL (OUTPATIENT)
Age: 69
End: 2019-08-15

## 2019-08-20 NOTE — DISCHARGE NOTE ADULT - PRINCIPAL DIAGNOSIS
I have reviewed discharge instructions with the patient. The patient verbalized understanding. Patient left ED via Discharge Method: ambulatory to Home with  wife). Opportunity for questions and clarification provided. Patient given 1 scripts. To continue your aftercare when you leave the hospital, you may receive an automated call from our care team to check in on how you are doing. This is a free service and part of our promise to provide the best care and service to meet your aftercare needs.  If you have questions, or wish to unsubscribe from this service please call [phone]. Thank you for Choosing our [facility] Department. Primary osteoarthritis of right knee

## 2019-08-30 ENCOUNTER — APPOINTMENT (OUTPATIENT)
Dept: FAMILY MEDICINE | Facility: CLINIC | Age: 69
End: 2019-08-30
Payer: MEDICARE

## 2019-08-30 VITALS
OXYGEN SATURATION: 96 % | TEMPERATURE: 98.9 F | SYSTOLIC BLOOD PRESSURE: 149 MMHG | DIASTOLIC BLOOD PRESSURE: 94 MMHG | HEART RATE: 138 BPM | WEIGHT: 145 LBS | HEIGHT: 62 IN | BODY MASS INDEX: 26.68 KG/M2

## 2019-08-30 DIAGNOSIS — R53.83 OTHER FATIGUE: ICD-10-CM

## 2019-08-30 DIAGNOSIS — R63.4 ABNORMAL WEIGHT LOSS: ICD-10-CM

## 2019-08-30 LAB — HBA1C MFR BLD HPLC: 7.1

## 2019-08-30 PROCEDURE — 99214 OFFICE O/P EST MOD 30 MIN: CPT

## 2019-08-30 PROCEDURE — 83036 HEMOGLOBIN GLYCOSYLATED A1C: CPT | Mod: QW

## 2019-08-30 NOTE — REVIEW OF SYSTEMS
[Fatigue] : fatigue [Recent Change In Weight] : ~T recent weight change [Abdominal Pain] : abdominal pain [Nausea] : nausea [FreeTextEntry2] : Lost 30 lbs in < 2 months

## 2019-08-30 NOTE — HISTORY OF PRESENT ILLNESS
[Other: _____] : [unfilled] [FreeTextEntry1] : Not feeling well\par Poor appetite [de-identified] : 69 y/o F w/ hx DM, GERD, HTN, HLD, pancreatic mass .\par 7/1st/19 EGD w/ Dr Flynn, inconclusive Biopsy result.Negative for malignancy.\par Pt was admitted 7/10/19 at [facility] EUS showed Pancreatic AdenoCa.\par \par Pt was initially evaluated at Oncology at [facility] and then had a 2nd opinion at [facility] where she started Chemotherapy. Now after 3 cycles.\par Pt is here today, not feeling well. Very poor appetite. According to son not drinking or eating. Feels very fatigue, malaise.\par Recently received IVF at [facility], 3-4 days ago, where blood tests where also done.

## 2019-08-30 NOTE — HEALTH RISK ASSESSMENT
[Intercurrent ED visits] : went to ED [No] : In the past 12 months have you used drugs other than those required for medical reasons? No [de-identified] : 6/20/19 [] : No

## 2019-08-30 NOTE — ASSESSMENT
[FreeTextEntry1] : 67 y/o F with recent Dx Pancreatic Adeno Ca on chemotherapy at [facility]:\par \par Pancreatic Adeno ca at [facility]:\par -on 3rd cycle chemotherapy.\par \par Nausea/ Anorexia: secondary to chemotherapy/ WEight loss\par -Will request records at [facility]\par -Son advise ER evaluation for IVF.\par  \par -IUD is seen within the endometrial canal.: on CT scan 6/20/19  Fluid is also seen within the \par endometrial canal suggesting hydrocolpos, \par \par \par -S/P cataract surgery: doing well.\par \par -S/P RT Knee manipulation under anesthesia on 11/22/17.w/ dr Reyes..\par \par -DM: well control. HbA1c: 5.9% \par  On metformin daily.\par \par -HTn: \par On lisinopril. better control. Pt states is under stress. Will continue monitoring.\par \par -Dyslipidemia: on atorvastatin.\par \par -Anemia: \par On iron otc.\par Ferritin today\par \par -colonoscopy.:8/2018. w/ Dr Hernandez.\par Polypectomy. Needs to repeat in 5 years\par \par -Mammo: 7/2018\par \par -Gyn: PAP 1/2019\par \par -Diabetic foot exam: normal.\par \par -Ophthalmology: 6/2018\par \par -Systolic murmur: Seen by cardiology..\par  Immunizations: refused. \par

## 2019-08-30 NOTE — PHYSICAL EXAM
[No Acute Distress] : no acute distress [Well Developed] : well developed [Ill-Appearing] : ill-appearing [Well-Appearing] : well-appearing [Normal Sclera/Conjunctiva] : normal sclera/conjunctiva [PERRL] : pupils equal round and reactive to light [EOMI] : extraocular movements intact [Normal Outer Ear/Nose] : the outer ears and nose were normal in appearance [Normal Oropharynx] : the oropharynx was normal [No JVD] : no jugular venous distention [Supple] : supple [No Lymphadenopathy] : no lymphadenopathy [Thyroid Normal, No Nodules] : the thyroid was normal and there were no nodules present [No Respiratory Distress] : no respiratory distress  [Clear to Auscultation] : lungs were clear to auscultation bilaterally [No Accessory Muscle Use] : no accessory muscle use [Normal Rate] : normal rate  [Regular Rhythm] : with a regular rhythm [Normal S1, S2] : normal S1 and S2 [No Murmur] : no murmur heard [No Abdominal Bruit] : a ~M bruit was not heard ~T in the abdomen [No Carotid Bruits] : no carotid bruits [No Varicosities] : no varicosities [Pedal Pulses Present] : the pedal pulses are present [No Extremity Clubbing/Cyanosis] : no extremity clubbing/cyanosis [No Edema] : there was no peripheral edema [Soft] : abdomen soft [No Palpable Aorta] : no palpable aorta [Non-distended] : non-distended [No Masses] : no abdominal mass palpated [No HSM] : no HSM [Normal Bowel Sounds] : normal bowel sounds [Epigastric] : in the epigastric area [RUQ] : in the right upper quadrant [Diffuse] : there was tenderness diffusely on palpation [Normal Posterior Cervical Nodes] : no posterior cervical lymphadenopathy [Normal Anterior Cervical Nodes] : no anterior cervical lymphadenopathy [No CVA Tenderness] : no CVA  tenderness [No Joint Swelling] : no joint swelling [No Spinal Tenderness] : no spinal tenderness [Grossly Normal Strength/Tone] : grossly normal strength/tone [Normal Gait] : normal gait [No Rash] : no rash [Coordination Grossly Intact] : coordination grossly intact [No Focal Deficits] : no focal deficits [Deep Tendon Reflexes (DTR)] : deep tendon reflexes were 2+ and symmetric [Normal Affect] : the affect was normal [Normal Insight/Judgement] : insight and judgment were intact

## 2019-09-11 NOTE — DISCUSSION/SUMMARY
[Specialty: _____] : Specialty: [unfilled] [PCP: _____] : PCP: [unfilled] [FreeTextEntry1] : CRISTAL BRISENO discharged from [facility] on 9/9/19.\par  [FreeTextEntry3] : Patient could not be reached via telephone, letter sent to address on file. Medication reconciliation pending. Hospital follow up appointment pending.\par  [Home] : patient was discharged to home

## 2019-11-01 ENCOUNTER — APPOINTMENT (OUTPATIENT)
Dept: FAMILY MEDICINE | Facility: CLINIC | Age: 69
End: 2019-11-01
Payer: MEDICARE

## 2019-11-01 VITALS
OXYGEN SATURATION: 100 % | SYSTOLIC BLOOD PRESSURE: 121 MMHG | WEIGHT: 139 LBS | HEART RATE: 64 BPM | TEMPERATURE: 97.7 F | BODY MASS INDEX: 25.58 KG/M2 | DIASTOLIC BLOOD PRESSURE: 79 MMHG | HEIGHT: 62 IN

## 2019-11-01 DIAGNOSIS — R13.10 DYSPHAGIA, UNSPECIFIED: ICD-10-CM

## 2019-11-01 LAB — HBA1C MFR BLD HPLC: 5.5

## 2019-11-01 PROCEDURE — 83036 HEMOGLOBIN GLYCOSYLATED A1C: CPT | Mod: QW

## 2019-11-01 PROCEDURE — 99214 OFFICE O/P EST MOD 30 MIN: CPT | Mod: 25

## 2019-11-01 RX ORDER — TRAMADOL HYDROCHLORIDE 50 MG/1
50 TABLET, COATED ORAL
Qty: 30 | Refills: 0 | Status: DISCONTINUED | COMMUNITY
Start: 2019-06-24 | End: 2019-11-01

## 2019-11-01 RX ORDER — LISINOPRIL 40 MG/1
40 TABLET ORAL DAILY
Qty: 90 | Refills: 1 | Status: DISCONTINUED | COMMUNITY
Start: 2017-12-04 | End: 2019-11-01

## 2019-11-01 RX ORDER — RIVAROXABAN 20 MG/1
20 TABLET, FILM COATED ORAL
Refills: 0 | Status: ACTIVE | COMMUNITY
Start: 2019-11-01

## 2019-11-01 RX ORDER — LISINOPRIL 40 MG/1
40 TABLET ORAL
Qty: 1 | Refills: 1 | Status: DISCONTINUED | COMMUNITY
Start: 2018-06-27 | End: 2019-11-01

## 2019-11-01 NOTE — HISTORY OF PRESENT ILLNESS
[Other: _____] : [unfilled] [FreeTextEntry1] : Difficulty swallowing [de-identified] : 68 y/o F w/ hx DM, GERD, HTN, HLD,Pancreatic Adenoca. treated at [facility].\par On chemotherapy every 2 weeks.\par Reports difficulty to swallow solids since Chemo started. States has good appetite.\par Admitted to [facility] on 8/30/19 until 9/6/19 with Dx: gastritis/ Esophagitis, Moderate malnutrition, pancytopenia.\par Pt on Liquid diet.

## 2019-11-01 NOTE — HEALTH RISK ASSESSMENT
[Intercurrent ED visits] : went to ED [No] : In the past 12 months have you used drugs other than those required for medical reasons? No [] : No [de-identified] : 6/20/19

## 2019-11-01 NOTE — PHYSICAL EXAM
[No Acute Distress] : no acute distress [Ill-Appearing] : ill-appearing [Normal Sclera/Conjunctiva] : normal sclera/conjunctiva [PERRL] : pupils equal round and reactive to light [EOMI] : extraocular movements intact [Normal Outer Ear/Nose] : the outer ears and nose were normal in appearance [Normal Oropharynx] : the oropharynx was normal [No JVD] : no jugular venous distention [Supple] : supple [No Lymphadenopathy] : no lymphadenopathy [Thyroid Normal, No Nodules] : the thyroid was normal and there were no nodules present [No Respiratory Distress] : no respiratory distress  [Clear to Auscultation] : lungs were clear to auscultation bilaterally [No Accessory Muscle Use] : no accessory muscle use [Normal Rate] : normal rate  [Regular Rhythm] : with a regular rhythm [Normal S1, S2] : normal S1 and S2 [No Murmur] : no murmur heard [No Carotid Bruits] : no carotid bruits [No Abdominal Bruit] : a ~M bruit was not heard ~T in the abdomen [No Varicosities] : no varicosities [Pedal Pulses Present] : the pedal pulses are present [No Edema] : there was no peripheral edema [No Extremity Clubbing/Cyanosis] : no extremity clubbing/cyanosis [No Palpable Aorta] : no palpable aorta [Soft] : abdomen soft [Non-distended] : non-distended [No Masses] : no abdominal mass palpated [No HSM] : no HSM [Normal Bowel Sounds] : normal bowel sounds [Epigastric] : in the epigastric area [RUQ] : in the right upper quadrant [Diffuse] : there was tenderness diffusely on palpation [Normal Posterior Cervical Nodes] : no posterior cervical lymphadenopathy [Normal Anterior Cervical Nodes] : no anterior cervical lymphadenopathy [No CVA Tenderness] : no CVA  tenderness [No Spinal Tenderness] : no spinal tenderness [No Joint Swelling] : no joint swelling [Grossly Normal Strength/Tone] : grossly normal strength/tone [No Rash] : no rash [Normal Gait] : normal gait [Coordination Grossly Intact] : coordination grossly intact [No Focal Deficits] : no focal deficits [Deep Tendon Reflexes (DTR)] : deep tendon reflexes were 2+ and symmetric [Normal Affect] : the affect was normal [Normal Insight/Judgement] : insight and judgment were intact

## 2019-11-01 NOTE — ASSESSMENT
[FreeTextEntry1] : 67 y/o F with recent Dx Pancreatic Adeno Ca on chemotherapy at [facility]:\par \par Pancreatic Adeno ca treated at [facility]:\par -on chemotherapy every 2 weeks\par -On oxycodone and Fentanyl prn\par \par Nausea/ Anorexia: secondary to chemotherapy/ WEight loss> Dysphagia\par -GI referral.\par  \par -IUD is seen within the endometrial canal.: on CT scan 6/20/19  Fluid is also seen within the \par endometrial canal suggesting hydrocolpos, \par \par \par -S/P cataract surgery: doing well.\par \par -S/P RT Knee manipulation under anesthesia on 11/22/17.w/ dr Reyes..\par \par -DM: well control. HbA1c: 5.5% \par  On metformin daily.\par \par -HTn: \par Well control\par -Dyslipidemia: on atorvastatin.\par \par \par -colonoscopy.:8/2018. w/ Dr Hernandez.\par Polypectomy. Needs to repeat in 5 years\par \par -Mammo: 7/2018\par \par -Gyn: PAP 1/2019\par \par -Diabetic foot exam: normal.\par \par -Ophthalmology: 6/2018\par \par -Systolic murmur: Seen by cardiology..\par  Immunizations: refused. \par

## 2019-11-01 NOTE — REVIEW OF SYSTEMS
[Fatigue] : fatigue [Recent Change In Weight] : ~T recent weight change [Abdominal Pain] : abdominal pain [Nausea] : no nausea [FreeTextEntry2] : Lost 30 lbs in < 2 months [FreeTextEntry7] : dysphagia

## 2020-01-10 ENCOUNTER — APPOINTMENT (OUTPATIENT)
Dept: FAMILY MEDICINE | Facility: CLINIC | Age: 70
End: 2020-01-10
Payer: MEDICARE

## 2020-01-10 VITALS
HEART RATE: 66 BPM | WEIGHT: 124 LBS | DIASTOLIC BLOOD PRESSURE: 86 MMHG | BODY MASS INDEX: 22.82 KG/M2 | HEIGHT: 62 IN | TEMPERATURE: 97.3 F | OXYGEN SATURATION: 99 % | SYSTOLIC BLOOD PRESSURE: 134 MMHG

## 2020-01-10 DIAGNOSIS — J31.0 CHRONIC RHINITIS: ICD-10-CM

## 2020-01-10 DIAGNOSIS — R63.0 ANOREXIA: ICD-10-CM

## 2020-01-10 PROCEDURE — 99214 OFFICE O/P EST MOD 30 MIN: CPT

## 2020-01-10 RX ORDER — OXYCODONE 10 MG/1
10 TABLET ORAL
Refills: 0 | Status: DISCONTINUED | COMMUNITY
End: 2020-01-10

## 2020-01-10 RX ORDER — FLUTICASONE PROPIONATE 50 UG/1
50 SPRAY, METERED NASAL DAILY
Qty: 1 | Refills: 0 | Status: ACTIVE | COMMUNITY
Start: 2020-01-10 | End: 1900-01-01

## 2020-01-10 RX ORDER — METFORMIN HYDROCHLORIDE 500 MG/1
500 TABLET, COATED ORAL
Qty: 180 | Refills: 1 | Status: DISCONTINUED | COMMUNITY
End: 2020-01-10

## 2020-01-10 RX ORDER — ATORVASTATIN CALCIUM 20 MG/1
20 TABLET, FILM COATED ORAL
Qty: 1 | Refills: 1 | Status: DISCONTINUED | COMMUNITY
Start: 2017-11-20 | End: 2020-01-10

## 2020-01-10 RX ORDER — ERGOCALCIFEROL 1.25 MG/1
1.25 MG CAPSULE, LIQUID FILLED ORAL
Qty: 12 | Refills: 1 | Status: ACTIVE | COMMUNITY
Start: 2017-10-06 | End: 1900-01-01

## 2020-01-10 RX ORDER — AMLODIPINE BESYLATE 5 MG/1
5 TABLET ORAL
Qty: 90 | Refills: 1 | Status: DISCONTINUED | COMMUNITY
End: 2020-01-10

## 2020-01-10 RX ORDER — POTASSIUM CHLORIDE 750 MG/1
10 TABLET, EXTENDED RELEASE ORAL DAILY
Refills: 3 | Status: ACTIVE | COMMUNITY
Start: 2020-01-10

## 2020-01-10 NOTE — ASSESSMENT
[FreeTextEntry1] : 70 y/o F with Dx Pancreatic Adeno Ca on chemotherapy at [facility]:\par \par Pancreatic Adeno ca treated at [facility]:\par -on chemotherapy every other week\par \par Nausea/ Anorexia: secondary to chemotherapy/ WEight loss> Dysphagia\par \par Chronic Rhinitis:\par -start fluticasone\par \par \par -IUD is seen within the endometrial canal.: on CT scan 6/20/19  Fluid is also seen within the \par endometrial canal suggesting hydrocolpos, \par \par \par -S/P cataract surgery: doing well.\par \par -S/P RT Knee manipulation under anesthesia on 11/22/17.w/ dr Reyes..\par \par -Hx DM: well control.\par  HbA1c: 5.5% \par \par \par \par -colonoscopy.:8/2018. w/ Dr Hernandez.\par Polypectomy. Needs to repeat in 5 years\par \par -Mammo: 7/2018\par \par -Gyn: PAP 1/2019\par \par -Diabetic foot exam: normal.\par \par -Ophthalmology: 6/2018\par \par -Systolic murmur: Seen by cardiology..\par  Immunizations: refused. \par

## 2020-01-10 NOTE — PHYSICAL EXAM
[No Acute Distress] : no acute distress [Ill-Appearing] : ill-appearing [Normal Sclera/Conjunctiva] : normal sclera/conjunctiva [Normal Outer Ear/Nose] : the outer ears and nose were normal in appearance [EOMI] : extraocular movements intact [PERRL] : pupils equal round and reactive to light [No JVD] : no jugular venous distention [Normal Oropharynx] : the oropharynx was normal [Thyroid Normal, No Nodules] : the thyroid was normal and there were no nodules present [Supple] : supple [No Lymphadenopathy] : no lymphadenopathy [No Respiratory Distress] : no respiratory distress  [No Accessory Muscle Use] : no accessory muscle use [Clear to Auscultation] : lungs were clear to auscultation bilaterally [Normal Rate] : normal rate  [Regular Rhythm] : with a regular rhythm [Normal S1, S2] : normal S1 and S2 [No Murmur] : no murmur heard [No Varicosities] : no varicosities [No Abdominal Bruit] : a ~M bruit was not heard ~T in the abdomen [No Carotid Bruits] : no carotid bruits [No Extremity Clubbing/Cyanosis] : no extremity clubbing/cyanosis [Pedal Pulses Present] : the pedal pulses are present [No Edema] : there was no peripheral edema [Soft] : abdomen soft [No Palpable Aorta] : no palpable aorta [Non-distended] : non-distended [No HSM] : no HSM [No Masses] : no abdominal mass palpated [Normal Bowel Sounds] : normal bowel sounds [RUQ] : in the right upper quadrant [Diffuse] : there was tenderness diffusely on palpation [Epigastric] : in the epigastric area [No CVA Tenderness] : no CVA  tenderness [Normal Anterior Cervical Nodes] : no anterior cervical lymphadenopathy [Normal Posterior Cervical Nodes] : no posterior cervical lymphadenopathy [Grossly Normal Strength/Tone] : grossly normal strength/tone [No Spinal Tenderness] : no spinal tenderness [No Joint Swelling] : no joint swelling [No Rash] : no rash [Coordination Grossly Intact] : coordination grossly intact [Normal Gait] : normal gait [Deep Tendon Reflexes (DTR)] : deep tendon reflexes were 2+ and symmetric [Normal Affect] : the affect was normal [Normal Insight/Judgement] : insight and judgment were intact [No Focal Deficits] : no focal deficits

## 2020-01-10 NOTE — REVIEW OF SYSTEMS
[Fatigue] : fatigue [Recent Change In Weight] : ~T recent weight change [Abdominal Pain] : abdominal pain [FreeTextEntry2] : Lost 30 lbs in < 2 months [FreeTextEntry7] : dysphagia [Nausea] : no nausea

## 2020-03-04 NOTE — H&P PST ADULT - VASCULAR
What Is The Patient's Gender: Female Nosebleeds Normal Treatment: I explained this is common when taking isotretinoin. I recommended saline mist in each nostril multiple times a day. If this worsens they will contact us. Headache Monitoring: I recommended monitoring the headaches for now. There is no evidence of increased intracranial pressure. They were instructed to call if the headaches are worsening. Lower Range (In Mg/Kg): 120 Any Headache: No Is Xerosis Present?: Yes - Normal Treatment Pounds Preamble Statement (Weight Entered In Details Tab): Reported Weight in pounds: Retinoid Dermatitis Aggressive Treatment: I recommended more frequent application of Cetaphil or CeraVe to the areas of dermatitis. I also prescribed a topical steroid for twice daily use until the dermatitis resolves. Myalgia Monitoring: I explained this is common when taking isotretinoin. If this worsens they will contact us. Dosing Month 2 (Required For Cumulative Dosing): 30mg BID Xerosis Aggressive Treatment: I recommended application of Cetaphil or CeraVe numerous times a day and before going to bed to all dry areas. I also prescribed a topical steroid for twice daily use. Add Associated Diagnosis When Managing Medication Side Effects: Yes Detail Level: Zone Cheilitis Aggressive Treatment: I recommended application of Vaseline or Aquaphor numerous times a day (as often as every hour) and before going to bed. I also prescribed a topical steroid for twice daily use. Use Therapeutic Ranged Or Therapeutic Target: please select Range or Target Kilograms Preamble Statement (Weight Entered In Details Tab): Reported Weight in kilograms: Cheilitis Normal Treatment: Recommend dr hurley Are Labs Available For Review?: No- Not Drawn Yet Weight Units: pounds Retinoid Dermatitis Normal Treatment: I recommended more frequent application of Cetaphil or CeraVe to the areas of dermatitis. detailed exam Patient Weight (Optional But Required For Cumulative Dose-Numbers And Decimals Only): 130 Female Pregnancy Counseling Text: Female patients should also be on two forms of birth control while taking this medication and for one month after their last dose. Counseling Text: I reviewed the side effect in detail. Patient should get monthly blood tests, not donate blood, not drive at night if vision affected, and not share medication. Myalgia Treatment: I explained this is common when taking isotretinoin. If this worsens they will contact us. They may try OTC ibuprofen. Xerosis Normal Treatment: I recommended application of Hylatopic lotion numerous times a day and before going to bed to all dry areas. Next Month's Dosage: Continue Current Dosage Male Completion Statement: After discussing his treatment course we decided to discontinue isotretinoin therapy at this time. He shouldn't donate blood for one month after the last dose. He should call with any new symptoms of depression. Target Cumulative Dosage (In Mg/Kg): 135 Ipledge Number (Optional): [id number] Dosing Month 1 (Required For Cumulative Dosing): 30mg Daily Months Of Therapy Completed: 1 Xerosis Normal Treatment: I recommended application of Cetaphil or CeraVe numerous times a day going to bed to all dry areas. Dosing Month 3 (Required For Cumulative Dosing): 40mg BID Cheilitis Normal Treatment: I recommended application of Vaseline or Aquaphor numerous times a day (as often as every hour) and before going to bed. Female Completion Statement: After discussing her treatment course we decided to discontinue isotretinoin therapy at this time. I explained that she would need to continue her birth control methods for at least one month after the last dosage. She should also get a pregnancy test one month after the last dose. She shouldn't donate blood for one month after the last dose. She should call with any new symptoms of depression. Xerosis Aggressive Treatment: I recommended application of Cetaphil or CeraVe numerous times a day going to bed to all dry areas. I also prescribed a topical steroid for twice daily use. Hypertriglyceridemia Monitoring: I explained this is common when taking isotretinoin. We will monitor closely. Upper Range (In Mg/Kg): 150

## 2020-06-03 ENCOUNTER — APPOINTMENT (OUTPATIENT)
Dept: FAMILY MEDICINE | Facility: CLINIC | Age: 70
End: 2020-06-03
Payer: MEDICARE

## 2020-06-03 VITALS
TEMPERATURE: 98.4 F | BODY MASS INDEX: 22.08 KG/M2 | SYSTOLIC BLOOD PRESSURE: 141 MMHG | DIASTOLIC BLOOD PRESSURE: 85 MMHG | HEART RATE: 78 BPM | HEIGHT: 62 IN | WEIGHT: 120 LBS | OXYGEN SATURATION: 98 %

## 2020-06-03 DIAGNOSIS — C79.9 SECONDARY MALIGNANT NEOPLASM OF UNSPECIFIED SITE: ICD-10-CM

## 2020-06-03 DIAGNOSIS — C25.9 MALIGNANT NEOPLASM OF PANCREAS, UNSPECIFIED: ICD-10-CM

## 2020-06-03 DIAGNOSIS — Z79.899 OTHER LONG TERM (CURRENT) DRUG THERAPY: ICD-10-CM

## 2020-06-03 PROCEDURE — 99214 OFFICE O/P EST MOD 30 MIN: CPT

## 2020-06-03 NOTE — PHYSICAL EXAM
[No Acute Distress] : no acute distress [Ill-Appearing] : ill-appearing [Normal Sclera/Conjunctiva] : normal sclera/conjunctiva [PERRL] : pupils equal round and reactive to light [EOMI] : extraocular movements intact [Normal Outer Ear/Nose] : the outer ears and nose were normal in appearance [Normal Oropharynx] : the oropharynx was normal [No JVD] : no jugular venous distention [Supple] : supple [No Lymphadenopathy] : no lymphadenopathy [Thyroid Normal, No Nodules] : the thyroid was normal and there were no nodules present [No Respiratory Distress] : no respiratory distress  [Clear to Auscultation] : lungs were clear to auscultation bilaterally [No Accessory Muscle Use] : no accessory muscle use [Normal Rate] : normal rate  [Regular Rhythm] : with a regular rhythm [Normal S1, S2] : normal S1 and S2 [No Murmur] : no murmur heard [No Carotid Bruits] : no carotid bruits [No Abdominal Bruit] : a ~M bruit was not heard ~T in the abdomen [No Varicosities] : no varicosities [Pedal Pulses Present] : the pedal pulses are present [No Edema] : there was no peripheral edema [No Extremity Clubbing/Cyanosis] : no extremity clubbing/cyanosis [No Palpable Aorta] : no palpable aorta [Soft] : abdomen soft [Non-distended] : non-distended [No Masses] : no abdominal mass palpated [No HSM] : no HSM [Normal Bowel Sounds] : normal bowel sounds [Epigastric] : in the epigastric area [Diffuse] : there was tenderness diffusely on palpation [RUQ] : in the right upper quadrant [Normal Posterior Cervical Nodes] : no posterior cervical lymphadenopathy [Normal Anterior Cervical Nodes] : no anterior cervical lymphadenopathy [No CVA Tenderness] : no CVA  tenderness [No Joint Swelling] : no joint swelling [No Spinal Tenderness] : no spinal tenderness [Grossly Normal Strength/Tone] : grossly normal strength/tone [No Rash] : no rash [Normal Gait] : normal gait [Coordination Grossly Intact] : coordination grossly intact [No Focal Deficits] : no focal deficits [Normal Affect] : the affect was normal [Deep Tendon Reflexes (DTR)] : deep tendon reflexes were 2+ and symmetric [Normal Insight/Judgement] : insight and judgment were intact

## 2020-06-03 NOTE — HEALTH RISK ASSESSMENT
[Intercurrent ED visits] : went to ED [No] : In the past 12 months have you used drugs other than those required for medical reasons? No [de-identified] : 6/20/19 [] : No

## 2020-06-03 NOTE — ASSESSMENT
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 w/ Dr Hernandez.\par Polypectomy. Needs to repeat in 5 years\par \par -Mammo: 7/2018\par \par -Gyn: PAP 1/2019\par \par -Diabetic foot exam: normal.\par \par -Ophthalmology: 6/2018\par \par -Systolic murmur: Seen by cardiology..\par  Immunizations: refused. \par \par Pt had complete blood tests done today order by oncology.\par F/up prn\par

## 2020-06-03 NOTE — HISTORY OF PRESENT ILLNESS
[Other: _____] : [unfilled] [de-identified] : 68 y/o F w/past  hx DM, GERD, HTN, HLD,Pancreatic Adenoca Dx 2019. treated at [facility].\par Pt received Modified Folfirinox every 2 weeks started 8/7/19-12/24/19.\par 02/24/20 Concurrent Chemoradiation with oral Capecitabine completed 4/2/20.\par Recent CT 5/13/20: new liver and Lung MTS.\par Pt will continue Paliative treatment with Folfiri.\par Pt reports to feel good, in good spirit and positive.\par reports good appetite.\par States had blood tests done this AM order by Oncology\par  [FreeTextEntry1] : regular f/up

## 2020-09-29 NOTE — ASU PATIENT PROFILE, ADULT - BILL OF RIGHTS/ADMISSION INFORMATION PROVIDED TO:
Head , normocephalic , atraumatic , Face , Face within normal limits , Ears , External ears within normal limits , Nose/Nasopharynx , External nose  normal appearance , Mouth and Throat , Oral cavity appearance normal
Patient

## 2020-11-12 ENCOUNTER — NON-APPOINTMENT (OUTPATIENT)
Age: 70
End: 2020-11-12

## 2020-11-27 NOTE — ED ADULT NURSE NOTE - GASTROINTESTINAL WDL
Abdomen soft, nontender, nondistended, bowel sounds present in all 4 quadrants. Extra-ocular movement intact, eyes are clear b/l

## 2020-12-16 PROBLEM — Z12.11 ENCOUNTER FOR SCREENING COLONOSCOPY: Status: RESOLVED | Noted: 2018-06-19 | Resolved: 2020-12-16

## 2020-12-21 PROBLEM — Z01.419 ENCOUNTER FOR GYNECOLOGICAL EXAMINATION WITHOUT ABNORMAL FINDING: Status: RESOLVED | Noted: 2019-01-30 | Resolved: 2020-12-21

## 2023-05-08 NOTE — HEALTH RISK ASSESSMENT
[Intercurrent ED visits] : went to ED [No] : In the past 12 months have you used drugs other than those required for medical reasons? No [] : No [de-identified] : 6/20/19 Complex Repair And O-T Advancement Flap Text: The defect edges were debeveled with a #15 scalpel blade.  The primary defect was closed partially with a complex linear closure.  Given the location of the remaining defect, shape of the defect and the proximity to free margins an O-T advancement flap was deemed most appropriate for complete closure of the defect.  Using a sterile surgical marker, an appropriate advancement flap was drawn incorporating the defect and placing the expected incisions within the relaxed skin tension lines where possible.    The area thus outlined was incised deep to adipose tissue with a #15 scalpel blade.  The skin margins were undermined to an appropriate distance in all directions utilizing iris scissors.

## 2023-05-09 NOTE — ED STATDOCS - CPE ED GASTRO NORM
Autoimmune pancreatitis
- - -

## 2023-07-19 NOTE — ED STATDOCS - ENMT, MLM
hair removal not indicated Nasal mucosa clear.  Mouth with normal mucosa  Throat has no vesicles, no oropharyngeal exudates and uvula is midline.

## 2024-02-28 NOTE — H&P PST ADULT - NS PRO LACT YNNA
"HOPE Nutrition Encounter   Mario seen by RD in conjunction with ID f/u regarding elevated A1C.     Appetite: Excellent    Physical activity: Walks all day at work.     Weight history:   Wt Readings from Last 3 Encounters:   02/27/24 94.4 kg (208 lb 3.2 oz)   11/28/23 92.9 kg (204 lb 12.8 oz)   11/08/23 90.9 kg (200 lb 6.4 oz)   BMI 32    Nutrition-related labs:    Lab Results   Component Value Date    HGBA1C 6.8 (H) 02/21/2024      Lab Results   Component Value Date    SODIUM 138 02/21/2024    K 4.3 02/21/2024     02/21/2024    CO2 28 02/21/2024    BUN 21 02/21/2024    CREATININE 1.12 02/21/2024    GLUC 101 03/20/2019    CALCIUM 9.6 02/21/2024      Lab Results   Component Value Date    CHOLESTEROL 127 02/21/2024    CHOLESTEROL 129 01/30/2023    CHOLESTEROL 125 10/27/2022     Lab Results   Component Value Date    HDL 34 (L) 02/21/2024    HDL 35 (L) 01/30/2023    HDL 34 (L) 10/27/2022     Lab Results   Component Value Date    TRIG 173 (H) 02/21/2024    TRIG 176 (H) 01/30/2023    TRIG 117 10/27/2022     No results found for: \"NONHDLC\"     Physical findings/skin integrity:  Skin intact       Nutrition Diagnosis    Problem: altered nutrition-related lab values (A1C)    Related to: energy intake > energy output over time     As Evidenced By: diet recall       Nutrition Intervention/Recommendations    Nutrition education intervention: provided    Nutrition recommendations: Replace ice cream with individually wrapped low calorie frozen fruit or ice cream pops     Teaching Method: verbal  printed material (Myplate)    Person Educated: patient      Comprehension: excellent    Receptivity: excellent    Expected compliance: fair    Collaboration/referral of nutrition care:    N/A      Goals:  Reduce A1C under 6.5%  Follow Myplate for portions   Replace high sugar ice cream with low calorie frozen treat       Monitoring/Evaluation:  Will follow up with pt next week via phone regarding goals.       Visit Summary  RD " discussed increase A1C levels. PT does not want to be on diabetic medication of any kind and feels he can lower his A1C with diet changes. He reports eating large portions of ice cream nightly. RD discussed methods for helping pt lower A1C. Prior education provided on DM diet provided. Pt still has his Myplate handout on his fridge. Will follow up via phone.   -Buffy Blanc RDN,LDN      no

## 2024-05-17 NOTE — ED ADULT NURSE NOTE - CADM POA PRESS ULCER
No
Discussed GA with ETT in detail with patient and all question sought and answered. Pt. agrees to all plans and wishes to proceed with surgical care.    Medical/cardiac evaluation/optimization noted and appreciated.

## 2024-09-26 NOTE — PATIENT PROFILE ADULT. - PSH
History of ovarian cystectomy    History of shoulder surgery  Left History/Exam/Medical decision making

## 2025-06-19 NOTE — ASU PREOP CHECKLIST - WEIGHT IN KG
June 19, 2025     Amilcar Lopez DO  [address]  [facility] [address]    Patient: Darcy Kwon   YOB: 1951   Date of Visit: 6/19/2025       Dear Dr. Amilcar Lopez DO:    Thank you for referring Darcy Kwon to me for evaluation. Below are my notes for this consultation.  If you have questions, please do not hesitate to call me. I look forward to following your patient along with you.       Sincerely,     Nuha Holland MD      CC: No Recipients  ______________________________________________________________________________________    Chief Complaint   Patient presents with   • Follow-up     7 month ICD (implantable cardioverter-defibrillator), biventricular, in situ       Subjective   Darcy Kwon is a 73 y.o. female     HPI   Patient is here for follow-up continue management for history of nonischemic cardiomyopathy and congestive heart failure, status post AICD, obesity and hyperlipidemia.  Since last time I saw her she is doing well.  She is luca functional class II.  She denies complaint of chest pain, palpitation, lightheadedness, dizziness or syncope.  She is reasonably active.  Her recent device check noted and reviewed with her.  Her last lab work from last year was noted and reviewed with her.  She is complaining of cough which I believe related to ACE  Assessment     1.  History of nonischemic cardiomyopathy and congestive heart failure currently functional class 2.  Last echo was last year  2.  Status post AICD implantation with Jhoan with ejection fraction of 30-35% currently functional class II.  She is on good medical therapy for heart failure including ACE and beta-blocker  3.  Obesity with BMI of 33 with no weight changes  4.  Hyperlipidemia on treatment rosuvastatin and controlled  5.  Previous complaint of shortness of breath markedly improved since she had her AICD placed  6.  Former smoker  7.  Cough likely due to ACE inhibitor's     Plan     1.  Patient  "appears to be on good medical therapy for LV systolic function and heart failure I will however switch her from ramipril to Diovan in view of recent cough  2.  Her recent device check and lab work all noted and reviewed with her  3.  We discussed coronary risk factor modification  4.  I recommended to repeat echocardiogram prior to next office visit  5.  I will see her back in follow-up next year  Review of Systems   All other systems reviewed and are negative.           Vitals:    06/19/25 1412   BP: 120/80   BP Location: Left arm   Patient Position: Sitting   Pulse: 68   Weight: 80.3 kg (177 lb)   Height: 1.549 m (5' 1\")        Objective   Physical Exam  Constitutional:       Appearance: Normal appearance.   HENT:      Nose: Nose normal.   Neck:      Vascular: No carotid bruit.   Cardiovascular:      Rate and Rhythm: Normal rate.      Pulses: Normal pulses.      Heart sounds: Normal heart sounds.   Pulmonary:      Effort: Pulmonary effort is normal.   Abdominal:      General: Bowel sounds are normal.      Palpations: Abdomen is soft.   Musculoskeletal:         General: Normal range of motion.      Cervical back: Normal range of motion.      Right lower leg: No edema.      Left lower leg: No edema.   Skin:     General: Skin is warm and dry.   Neurological:      General: No focal deficit present.      Mental Status: She is alert.   Psychiatric:         Mood and Affect: Mood normal.         Behavior: Behavior normal.         Thought Content: Thought content normal.         Judgment: Judgment normal.         Allergies  Atorvastatin     Current Medications  Current Outpatient Medications   Medication Instructions   • albuterol 90 mcg/actuation inhaler Every 4 hours RT   • CALCIUM CARBONATE-VITAMIN D3 ORAL 1 tablet, 2 times daily   • carvedilol (COREG) 12.5 mg, oral, 2 times daily   • cholecalciferol (Vitamin D-3) 25 MCG (1000 UT) tablet 1 tablet, Daily   • diphenhydrAMINE (SOMINEX) 25 mg, Nightly PRN   • furosemide " (LASIX) 20 mg, oral, Daily   • magnesium oxide (MAG-OX) 400 mg, oral, Daily   • mometasone (Nasonex) 50 mcg/actuation nasal spray Administer into affected nostril(s).   • multivitamin (Daily Multi-Vitamin) tablet 1 tablet, Daily   • omeprazole (PriLOSEC) 40 mg DR capsule Take by mouth.   • rosuvastatin (CRESTOR) 10 mg, oral, Daily   • spironolactone (ALDACTONE) 25 mg, oral, Daily   • tamoxifen (NOLVADEX) 20 mg, oral, Daily   • vit A/vit C/vit E/zinc/copper (ICAPS AREDS ORAL) Take by mouth.                        Assessment/Plan   1. Dilated cardiomyopathy (Multi)        2. Systolic congestive heart failure with reduced left ventricular function, NYHA class 1  Follow Up In Cardiology      3. LBBB (left bundle branch block)        4. ICD (implantable cardioverter-defibrillator), biventricular, in situ        5. Dyslipidemia (high LDL; low HDL)        6. BMI 33.0-33.9,adult        7. Shortness of breath        8. Former smoker        9. Cough, unspecified type                 Scribe Attestation  By signing my name below, Blanca TELLO LPN, Scribe   attest that this documentation has been prepared under the direction and in the presence of Nuha Holland MD.     Provider Attestation - Scribe documentation    All medical record entries made by the Scribe were at my direction and personally dictated by me. I have reviewed the chart and agree that the record accurately reflects my personal performance of the history, physical exam, discussion and plan.    93